# Patient Record
Sex: MALE | Employment: UNEMPLOYED | URBAN - METROPOLITAN AREA
[De-identification: names, ages, dates, MRNs, and addresses within clinical notes are randomized per-mention and may not be internally consistent; named-entity substitution may affect disease eponyms.]

---

## 2021-10-30 ENCOUNTER — HOSPITAL ENCOUNTER (OUTPATIENT)
Age: 51
Setting detail: OBSERVATION
Discharge: HOME | End: 2021-11-01
Attending: INTERNAL MEDICINE | Admitting: INTERNAL MEDICINE
Payer: MEDICAID

## 2021-10-30 DIAGNOSIS — E11.10 DIABETIC KETOACIDOSIS WITHOUT COMA ASSOCIATED WITH TYPE 2 DIABETES MELLITUS (HCC): Primary | ICD-10-CM

## 2021-10-30 PROBLEM — R73.9 HYPERGLYCEMIA: Status: ACTIVE | Noted: 2021-10-30

## 2021-10-30 LAB
ALBUMIN SERPL-MCNC: 4.4 GM/DL (ref 3.4–5)
ALP BLD-CCNC: 55 IU/L (ref 40–129)
ALT SERPL-CCNC: 26 U/L (ref 10–40)
ANION GAP SERPL CALCULATED.3IONS-SCNC: 12 MMOL/L (ref 4–16)
AST SERPL-CCNC: 23 IU/L (ref 15–37)
BACTERIA: NEGATIVE /HPF
BASE EXCESS: 1 (ref 0–3.3)
BASOPHILS ABSOLUTE: 0 K/CU MM
BASOPHILS RELATIVE PERCENT: 0.8 % (ref 0–1)
BETA-HYDROXYBUTYRATE: 1.5 MG/DL (ref 0–3)
BETA-HYDROXYBUTYRATE: 6.1 MG/DL (ref 0–3)
BILIRUB SERPL-MCNC: 0.5 MG/DL (ref 0–1)
BILIRUBIN URINE: NEGATIVE MG/DL
BLOOD, URINE: NEGATIVE
BUN BLDV-MCNC: 5 MG/DL (ref 6–23)
CALCIUM SERPL-MCNC: 9.4 MG/DL (ref 8.3–10.6)
CHLORIDE BLD-SCNC: 100 MMOL/L (ref 99–110)
CHP ED QC CHECK: NORMAL
CLARITY: CLEAR
CO2: 24 MMOL/L (ref 21–32)
COLOR: ABNORMAL
COMMENT: ABNORMAL
CREAT SERPL-MCNC: 0.6 MG/DL (ref 0.9–1.3)
DIFFERENTIAL TYPE: ABNORMAL
EOSINOPHILS ABSOLUTE: 0.2 K/CU MM
EOSINOPHILS RELATIVE PERCENT: 5.4 % (ref 0–3)
ESTIMATED AVERAGE GLUCOSE: 289 MG/DL
GFR AFRICAN AMERICAN: >60 ML/MIN/1.73M2
GFR NON-AFRICAN AMERICAN: >60 ML/MIN/1.73M2
GLUCOSE BLD-MCNC: 239 MG/DL
GLUCOSE BLD-MCNC: 239 MG/DL (ref 70–99)
GLUCOSE BLD-MCNC: 239 MG/DL (ref 70–99)
GLUCOSE BLD-MCNC: 248 MG/DL (ref 70–99)
GLUCOSE BLD-MCNC: 259 MG/DL (ref 70–99)
GLUCOSE BLD-MCNC: 286 MG/DL (ref 70–99)
GLUCOSE, URINE: >500 MG/DL
HBA1C MFR BLD: 11.7 % (ref 4.2–6.3)
HCO3 VENOUS: 26.6 MMOL/L (ref 19–25)
HCT VFR BLD CALC: 45.1 % (ref 42–52)
HEMOGLOBIN: 14.3 GM/DL (ref 13.5–18)
IMMATURE NEUTROPHIL %: 0 % (ref 0–0.43)
KETONES, URINE: ABNORMAL MG/DL
LEUKOCYTE ESTERASE, URINE: NEGATIVE
LYMPHOCYTES ABSOLUTE: 2 K/CU MM
LYMPHOCYTES RELATIVE PERCENT: 53.9 % (ref 24–44)
MCH RBC QN AUTO: 26.5 PG (ref 27–31)
MCHC RBC AUTO-ENTMCNC: 31.7 % (ref 32–36)
MCV RBC AUTO: 83.7 FL (ref 78–100)
MONOCYTES ABSOLUTE: 0.4 K/CU MM
MONOCYTES RELATIVE PERCENT: 9.8 % (ref 0–4)
MUCUS: ABNORMAL HPF
NITRITE URINE, QUANTITATIVE: NEGATIVE
NUCLEATED RBC %: 0 %
O2 SAT, VEN: 95.1 % (ref 50–70)
PCO2, VEN: 54 MMHG (ref 38–52)
PDW BLD-RTO: 12.4 % (ref 11.7–14.9)
PH VENOUS: 7.3 (ref 7.32–7.42)
PH, URINE: 6 (ref 5–8)
PLATELET # BLD: 192 K/CU MM (ref 140–440)
PMV BLD AUTO: 11.9 FL (ref 7.5–11.1)
PO2, VEN: 101 MMHG (ref 28–48)
POTASSIUM SERPL-SCNC: 4 MMOL/L (ref 3.5–5.1)
PROTEIN UA: NEGATIVE MG/DL
RBC # BLD: 5.39 M/CU MM (ref 4.6–6.2)
RBC URINE: <1 /HPF (ref 0–3)
SARS-COV-2, NAAT: NOT DETECTED
SEGMENTED NEUTROPHILS ABSOLUTE COUNT: 1.1 K/CU MM
SEGMENTED NEUTROPHILS RELATIVE PERCENT: 30.1 % (ref 36–66)
SODIUM BLD-SCNC: 136 MMOL/L (ref 135–145)
SOURCE: NORMAL
SPECIFIC GRAVITY UA: 1.01 (ref 1–1.03)
SQUAMOUS EPITHELIAL: <1 /HPF
TOTAL IMMATURE NEUTOROPHIL: 0 K/CU MM
TOTAL NUCLEATED RBC: 0 K/CU MM
TOTAL PROTEIN: 7.5 GM/DL (ref 6.4–8.2)
TRICHOMONAS: ABNORMAL /HPF
TROPONIN T: <0.01 NG/ML
UROBILINOGEN, URINE: NEGATIVE MG/DL (ref 0.2–1)
WBC # BLD: 3.7 K/CU MM (ref 4–10.5)
WBC UA: <1 /HPF (ref 0–2)

## 2021-10-30 PROCEDURE — 2580000003 HC RX 258: Performed by: FAMILY MEDICINE

## 2021-10-30 PROCEDURE — G0378 HOSPITAL OBSERVATION PER HR: HCPCS

## 2021-10-30 PROCEDURE — 81001 URINALYSIS AUTO W/SCOPE: CPT

## 2021-10-30 PROCEDURE — 96360 HYDRATION IV INFUSION INIT: CPT

## 2021-10-30 PROCEDURE — 93005 ELECTROCARDIOGRAM TRACING: CPT | Performed by: PHYSICIAN ASSISTANT

## 2021-10-30 PROCEDURE — 6370000000 HC RX 637 (ALT 250 FOR IP): Performed by: FAMILY MEDICINE

## 2021-10-30 PROCEDURE — 80053 COMPREHEN METABOLIC PANEL: CPT

## 2021-10-30 PROCEDURE — 82010 KETONE BODYS QUAN: CPT

## 2021-10-30 PROCEDURE — 82805 BLOOD GASES W/O2 SATURATION: CPT

## 2021-10-30 PROCEDURE — 99283 EMERGENCY DEPT VISIT LOW MDM: CPT

## 2021-10-30 PROCEDURE — 83036 HEMOGLOBIN GLYCOSYLATED A1C: CPT

## 2021-10-30 PROCEDURE — 85025 COMPLETE CBC W/AUTO DIFF WBC: CPT

## 2021-10-30 PROCEDURE — 96361 HYDRATE IV INFUSION ADD-ON: CPT

## 2021-10-30 PROCEDURE — 82800 BLOOD PH: CPT

## 2021-10-30 PROCEDURE — 2580000003 HC RX 258: Performed by: PHYSICIAN ASSISTANT

## 2021-10-30 PROCEDURE — 82962 GLUCOSE BLOOD TEST: CPT

## 2021-10-30 PROCEDURE — 87635 SARS-COV-2 COVID-19 AMP PRB: CPT

## 2021-10-30 PROCEDURE — 6370000000 HC RX 637 (ALT 250 FOR IP): Performed by: INTERNAL MEDICINE

## 2021-10-30 PROCEDURE — 84484 ASSAY OF TROPONIN QUANT: CPT

## 2021-10-30 PROCEDURE — 36415 COLL VENOUS BLD VENIPUNCTURE: CPT

## 2021-10-30 RX ORDER — SODIUM CHLORIDE 0.9 % (FLUSH) 0.9 %
5-40 SYRINGE (ML) INJECTION EVERY 12 HOURS SCHEDULED
Status: DISCONTINUED | OUTPATIENT
Start: 2021-10-30 | End: 2021-11-01 | Stop reason: HOSPADM

## 2021-10-30 RX ORDER — NICOTINE POLACRILEX 4 MG
15 LOZENGE BUCCAL PRN
Status: DISCONTINUED | OUTPATIENT
Start: 2021-10-30 | End: 2021-11-01 | Stop reason: HOSPADM

## 2021-10-30 RX ORDER — ACETAMINOPHEN 325 MG/1
650 TABLET ORAL EVERY 6 HOURS PRN
Status: DISCONTINUED | OUTPATIENT
Start: 2021-10-30 | End: 2021-11-01 | Stop reason: HOSPADM

## 2021-10-30 RX ORDER — 0.9 % SODIUM CHLORIDE 0.9 %
1000 INTRAVENOUS SOLUTION INTRAVENOUS ONCE
Status: COMPLETED | OUTPATIENT
Start: 2021-10-30 | End: 2021-10-30

## 2021-10-30 RX ORDER — DEXTROSE MONOHYDRATE 25 G/50ML
12.5 INJECTION, SOLUTION INTRAVENOUS PRN
Status: DISCONTINUED | OUTPATIENT
Start: 2021-10-30 | End: 2021-11-01 | Stop reason: HOSPADM

## 2021-10-30 RX ORDER — SODIUM CHLORIDE 0.9 % (FLUSH) 0.9 %
5-40 SYRINGE (ML) INJECTION PRN
Status: DISCONTINUED | OUTPATIENT
Start: 2021-10-30 | End: 2021-11-01 | Stop reason: HOSPADM

## 2021-10-30 RX ORDER — ONDANSETRON 4 MG/1
4 TABLET, ORALLY DISINTEGRATING ORAL EVERY 8 HOURS PRN
Status: DISCONTINUED | OUTPATIENT
Start: 2021-10-30 | End: 2021-11-01 | Stop reason: HOSPADM

## 2021-10-30 RX ORDER — DEXTROSE MONOHYDRATE 50 MG/ML
100 INJECTION, SOLUTION INTRAVENOUS PRN
Status: DISCONTINUED | OUTPATIENT
Start: 2021-10-30 | End: 2021-11-01 | Stop reason: HOSPADM

## 2021-10-30 RX ORDER — INSULIN GLARGINE 100 [IU]/ML
30 INJECTION, SOLUTION SUBCUTANEOUS NIGHTLY
Status: DISCONTINUED | OUTPATIENT
Start: 2021-10-30 | End: 2021-10-30

## 2021-10-30 RX ORDER — ONDANSETRON 2 MG/ML
4 INJECTION INTRAMUSCULAR; INTRAVENOUS EVERY 6 HOURS PRN
Status: DISCONTINUED | OUTPATIENT
Start: 2021-10-30 | End: 2021-11-01 | Stop reason: HOSPADM

## 2021-10-30 RX ORDER — ACETAMINOPHEN 650 MG/1
650 SUPPOSITORY RECTAL EVERY 6 HOURS PRN
Status: DISCONTINUED | OUTPATIENT
Start: 2021-10-30 | End: 2021-11-01 | Stop reason: HOSPADM

## 2021-10-30 RX ORDER — POLYETHYLENE GLYCOL 3350 17 G/17G
17 POWDER, FOR SOLUTION ORAL DAILY PRN
Status: DISCONTINUED | OUTPATIENT
Start: 2021-10-30 | End: 2021-11-01 | Stop reason: HOSPADM

## 2021-10-30 RX ORDER — SODIUM CHLORIDE 9 MG/ML
25 INJECTION, SOLUTION INTRAVENOUS PRN
Status: DISCONTINUED | OUTPATIENT
Start: 2021-10-30 | End: 2021-11-01 | Stop reason: HOSPADM

## 2021-10-30 RX ORDER — INSULIN GLARGINE 100 [IU]/ML
25 INJECTION, SOLUTION SUBCUTANEOUS NIGHTLY
Status: DISCONTINUED | OUTPATIENT
Start: 2021-10-30 | End: 2021-10-31

## 2021-10-30 RX ADMIN — INSULIN LISPRO 3 UNITS: 100 INJECTION, SOLUTION INTRAVENOUS; SUBCUTANEOUS at 18:11

## 2021-10-30 RX ADMIN — INSULIN GLARGINE 25 UNITS: 100 INJECTION, SOLUTION SUBCUTANEOUS at 21:02

## 2021-10-30 RX ADMIN — INSULIN LISPRO 4 UNITS: 100 INJECTION, SOLUTION INTRAVENOUS; SUBCUTANEOUS at 21:02

## 2021-10-30 RX ADMIN — SODIUM CHLORIDE, PRESERVATIVE FREE 10 ML: 5 INJECTION INTRAVENOUS at 21:01

## 2021-10-30 RX ADMIN — SODIUM CHLORIDE 1000 ML: 9 INJECTION, SOLUTION INTRAVENOUS at 16:17

## 2021-10-30 RX ADMIN — SODIUM CHLORIDE 1000 ML: 9 INJECTION, SOLUTION INTRAVENOUS at 17:44

## 2021-10-30 NOTE — ED PROVIDER NOTES
EKG:  Normal sinus rhythm with a rate of 90. CA interval 150, QRS 80, QTc 440. No ST elevations or depressions. Normal T waves. Impression: Normal EKG. No previous EKGs for comparison.       Joel Edgar MD  10/30/21 0637

## 2021-10-30 NOTE — ED PROVIDER NOTES
Patient Identification  Rufino Rousseau is a 46 y.o. male    Chief Complaint  Blood Sugar Problem        HPI  (History provided by patient through Kinyarwanda interpretor, [facility] interpretor unavailable)  This is a 46 y.o. male who was brought in by self for chief complaint of hyperglycemia. Patient reports h/o diabetes. States now he feels \"very sick\". Has been diabetic for 12 years. 2 days ago he began using oils to manage his health, now feels bad. Reports feeling generally weak, fatigue, HA. Ran out of his normal diabetes medicines 8-10 days ago. Typically on metformin 500 mg, \"gabafen? \". States he has been walking a large amount. REVIEW OF SYSTEMS    Constitutional:  Denies fever, chills  HENT:  Denies sore throat or ear pain   Eyes: Denies eye pain  Cardiovascular:  Denies chest pain  Respiratory:  Denies shortness of breath, cough   GI:  Denies abdominal pain, vomiting  :  Denies dysuria  Musculoskeletal:  Denies back pain. + joint pain from walking. Skin:  Denies rash  Neurologic:   + VILLELA    See HPI and nursing notes for additional information     I have reviewed the following nursing documentation:  Allergies: No Known Allergies    Past medical history:  has a past medical history of Diabetes mellitus ([facility] [address] Past surgical history:  has no past surgical history on file. Home medications:   Prior to Admission medications    Not on File       Social history:  reports that he has never smoked. He has never used smokeless tobacco. He reports previous alcohol use. He reports that he does not use drugs. Family history:  History reviewed. No pertinent family history. Exam  BP (!) 153/90   Pulse 91   Temp 97.8 °F (36.6 °C) (Oral)   Resp 12   Ht 5' 7\" (1.702 m)   Wt 168 lb (76.2 kg)   SpO2 100%   BMI 26.31 kg/m²   Nursing note and vitals reviewed. Constitutional: Well developed, well nourished. No acute distress. HENT:      Head: Normocephalic and atraumatic. Ears: External ears normal.      Nose: Nose normal.     Mouth: Membrane mucosa moist and pink. No posterior oropharynx erythema or tonsillar edema  Eyes: Anicteric sclera. No discharge, PERRL  Neck: Supple. Trachea midline. Cardiovascular: RRR, no murmurs, rubs, or gallops, radial pulses 2+ bilaterally. Pulmonary/Chest: Effort normal. No respiratory distress. CTAB. No stridor. No wheezes. No rales. Abdominal: Soft. Nontender to palpation. No distension. No guarding, rebound tenderness, or evidence of ascites. : No CVA tenderness. Musculoskeletal: Moves all extremities. No gross deformity. Neurological: Alert and oriented to person, place, and time. Normal muscle tone. Skin: Warm and dry. No rash. Psychiatric: Normal mood and affect. Behavior is normal.      EKG   Please see Dr. Parminder Larsen' note for EKG read.       Radiographs (if obtained):  [] The following radiograph was interpreted by myself in the absence of a radiologist:   [x] Radiologist's Report Reviewed:  No orders to display          Labs  Results for orders placed or performed during the hospital encounter of 10/30/21   COVID-19, Rapid    Specimen: Nasopharyngeal   Result Value Ref Range    Source THROAT     SARS-CoV-2, NAAT NOT DETECTED NOT DETECTED   CBC Auto Differential   Result Value Ref Range    WBC 3.7 (L) 4.0 - 10.5 K/CU MM    RBC 5.39 4.6 - 6.2 M/CU MM    Hemoglobin 14.3 13.5 - 18.0 GM/DL    Hematocrit 45.1 42 - 52 %    MCV 83.7 78 - 100 FL    MCH 26.5 (L) 27 - 31 PG    MCHC 31.7 (L) 32.0 - 36.0 %    RDW 12.4 11.7 - 14.9 %    Platelets 621 521 - 284 K/CU MM    MPV 11.9 (H) 7.5 - 11.1 FL    Differential Type AUTOMATED DIFFERENTIAL     Segs Relative 30.1 (L) 36 - 66 %    Lymphocytes % 53.9 (H) 24 - 44 %    Monocytes % 9.8 (H) 0 - 4 %    Eosinophils % 5.4 (H) 0 - 3 %    Basophils % 0.8 0 - 1 %    Segs Absolute 1.1 K/CU MM    Lymphocytes Absolute 2.0 K/CU MM    Monocytes Absolute 0.4 K/CU MM    Eosinophils Absolute 0.2 K/CU MM Basophils Absolute 0.0 K/CU MM    Nucleated RBC % 0.0 %    Total Nucleated RBC 0.0 K/CU MM    Total Immature Neutrophil 0.00 K/CU MM    Immature Neutrophil % 0.0 0 - 0.43 %   CMP   Result Value Ref Range    Sodium 136 135 - 145 MMOL/L    Potassium 4.0 3.5 - 5.1 MMOL/L    Chloride 100 99 - 110 mMol/L    CO2 24 21 - 32 MMOL/L    BUN 5 (L) 6 - 23 MG/DL    CREATININE 0.6 (L) 0.9 - 1.3 MG/DL    Glucose 286 (H) 70 - 99 MG/DL    Calcium 9.4 8.3 - 10.6 MG/DL    Albumin 4.4 3.4 - 5.0 GM/DL    Total Protein 7.5 6.4 - 8.2 GM/DL    Total Bilirubin 0.5 0.0 - 1.0 MG/DL    ALT 26 10 - 40 U/L    AST 23 15 - 37 IU/L    Alkaline Phosphatase 55 40 - 129 IU/L    GFR Non-African American >60 >60 mL/min/1.73m2    GFR African American >60 >60 mL/min/1.73m2    Anion Gap 12 4 - 16   Beta-Hydroxybutyrate   Result Value Ref Range    Beta-Hydroxybutyrate 6.1 (H) 0.0 - 3.0 MG/DL   Urinalysis   Result Value Ref Range    Color, UA STRAW (A) YELLOW    Clarity, UA CLEAR CLEAR    Glucose, Urine >500 (A) NEGATIVE MG/DL    Bilirubin Urine NEGATIVE NEGATIVE MG/DL    Ketones, Urine SMALL (A) NEGATIVE MG/DL    Specific Gravity, UA 1.011 1.001 - 1.035    Blood, Urine NEGATIVE NEGATIVE    pH, Urine 6.0 5.0 - 8.0    Protein, UA NEGATIVE NEGATIVE MG/DL    Urobilinogen, Urine NEGATIVE 0.2 - 1.0 MG/DL    Nitrite Urine, Quantitative NEGATIVE NEGATIVE    Leukocyte Esterase, Urine NEGATIVE NEGATIVE    RBC, UA <1 0 - 3 /HPF    WBC, UA <1 0 - 2 /HPF    Bacteria, UA NEGATIVE NEGATIVE /HPF    Squam Epithel, UA <1 /HPF    Mucus, UA RARE (A) NEGATIVE HPF    Trichomonas, UA NONE SEEN NONE SEEN /HPF   Troponin   Result Value Ref Range    Troponin T <0.010 <0.01 NG/ML   Blood Gas, Venous   Result Value Ref Range    pH, Tomas 7.30 (L) 7.32 - 7.42    pCO2, Tomas 54 (H) 38 - 52 mmHG    pO2, Tomas 101 (H) 28 - 48 mmHG    Base Excess 1 0 - 3.3    HCO3, Venous 26.6 (H) 19 - 25 MMOL/L    O2 Sat, Tomas 95.1 (H) 50 - 70 %    Comment VBG    POCT Glucose   Result Value Ref Range    POC Glucose 248 (H) 70 - 99 MG/DL   POCT Glucose   Result Value Ref Range    Glucose 239 mg/dL    QC OK? ok    POCT Glucose   Result Value Ref Range    POC Glucose 239 (H) 70 - 99 MG/DL   EKG 12 Lead   Result Value Ref Range    Ventricular Rate 90 BPM    Atrial Rate 90 BPM    P-R Interval 150 ms    QRS Duration 80 ms    Q-T Interval 360 ms    QTc Calculation (Bazett) 440 ms    P Axis 77 degrees    R Axis 43 degrees    T Axis 54 degrees    Diagnosis       Normal sinus rhythm  Normal ECG  No previous ECGs available           MDM  Patient presents for hyperglycemia, out of his meds, I am unable to identify all of his meds. He has normal VS.  States feels generally unwell. Labs concerning for mild DKA. Insulin infusion ordered. Plan is to admit. Spoke with hospitalist, Laron Lane CNP, who agreed to admit patient, recommended changing patient to insulin SQ and fluids. In consideration of current COVID19 pandemic, with effort to minimize unnecessary provider exposure, this patient was seen at bedside by me independently. However, in compliance with current hospital RALPH/ED protocol, prior to admission I did discuss this patient case with emergency department physician, Dr. Carlyn Ross. Of note, this Pt was NOT admitted to the ICU. Final Impression  1. Diabetic ketoacidosis without coma associated with type 2 diabetes mellitus (HCC)        Blood pressure (!) 153/90, pulse 91, temperature 97.8 °F (36.6 °C), temperature source Oral, resp. rate 12, height 5' 7\" (1.702 m), weight 168 lb (76.2 kg), SpO2 100 %. Disposition:  Admit to telemetry in stable condition. Patient was given scripts for the following medications. I counseled patient how to take these medications. New Prescriptions    No medications on file       This chart was generated using the [address] [place], [place] [address]  dictation system. I created this record but it may contain dictation errors given the limitations of this technology.        Colby Hernandez, OMAIRA  10/30/21 1874

## 2021-10-30 NOTE — ED NOTES
Vitals completed, patient is [place] [facility] speaking, hyperglycemia blood glucose 248     Sasha Pollack  10/30/21 1034

## 2021-10-30 NOTE — PLAN OF CARE
RALPH note to follow     I saw this patient who came in with DM. Not sure if this is even DKA. We repeated all the labs. The patient has a history of DM and HTN( not being treated)    Repeat labs   Start insulin   He is on metformin- hold   From there we will assess if he needs insulin drip.  The patient has no gap, HCO3 is >18     General: NAD, AAOX3  Lungs: CTAB  CVS: Normal s1s2  Abd: NT, ND   Ext: no edema

## 2021-10-30 NOTE — PROGRESS NOTES
Pt states \"I am here for the temporary license to be in [place] visiting my friend. I was dropped off by my friend when they said that my blood sugar was high. My people are in [place]. I am just passing through for now living with my friend neighbor. \"    Pt denies thoughts of harming himself or anyone else. Pt gave neighbor (Merceda Gowers) number [phone] to give her an update.

## 2021-10-30 NOTE — CONSULTS
Endocrinology   Consult Note      Dear Doctor Shirlyn Rinne for the Consult     Pt. Was Admitted for : Generalized weakness and hyperglycemia    Reason for Consult: Better control of blood glucose      History Obtained From:  Patient/ EMR       HISTORY OF PRESENT ILLNESS:                The patient is a 46 y.o. male with significant past medical history of diabetes and hypertension has been taking Metformin for his diabetes but ran out of it few days of back not take any medicine for hypertension I was  consulted for better control of blood glucose. ROS:   Pt's ROS done in detail. Abnormal ROS are noted in Medical and Surgical History Section below: Other Medical History:        Diagnosis Date    Diabetes mellitus ([address]      Surgical History:    History reviewed. No pertinent surgical history. Allergies:  Patient has no known allergies. Family History:   History reviewed. No pertinent family history. REVIEW OF SYSTEMS:  Review of System Done as noted above     PHYSICAL EXAM:      Vitals:    BP (!) 154/88   Pulse 82   Temp 97.8 °F (36.6 °C) (Oral)   Resp 16   Ht 5' 7\" (1.702 m)   Wt 168 lb (76.2 kg)   SpO2 100%   BMI 26.31 kg/m²     CONSTITUTIONAL:  awake, alert, cooperative, appears stated age   EYES:  vision intact Fundoscopic Exam not performed   ENT:Normal  NECK:  Supple, No JVD. Thyroid Exam:Normal   LUNGS:  Has Vesicular Breath Sounds,   CARDIOVASCULAR:  Normal apical impulse, regular rate and rhythm, normal S1 and S2, no S3 or S4, and has no  murmur   ABDOMEN:  No scars, normal bowel sounds, soft, non-distended, non-tender, no masses palpated, no hepatolienomegaly  Musculoskeletal: Normal  Extremities: Normal, peripheral pulses normal, , has no edema   NEUROLOGIC:  Awake, alert, oriented to name, place and time. Cranial nerves II-XII are grossly intact. Motor is  intact. Sensory is intact.  ,  and gait is normal.    DATA:    CBC:   Recent Labs     10/30/21  1000   WBC 3.7*   HGB 14.3       CMP:  Recent Labs     10/30/21  1000      K 4.0      CO2 24   BUN 5*   CREATININE 0.6*   CALCIUM 9.4   PROT 7.5   LABALBU 4.4   BILITOT 0.5   ALKPHOS 55   AST 23   ALT 26     Lipids: No results found for: CHOL, HDL, TRIG  Glucose:   Recent Labs     10/30/21  0901 10/30/21  1555 10/30/21  1746   POCGLU 248* 239* 259*     Hemoglobin A1C: No results found for: LABA1C  Free T4: No results found for: T4FREE  Free T3: No results found for: FT3  TSH High Sensitivity: No results found for: TSHHS    No orders to display          Scheduled Medicines   Medications:    sodium chloride  1,000 mL IntraVENous Once    insulin lispro  0-6 Units SubCUTAneous TID WC    insulin lispro  0-3 Units SubCUTAneous Nightly    sodium chloride flush  5-40 mL IntraVENous 2 times per day    enoxaparin  40 mg SubCUTAneous Daily    insulin glargine  30 Units SubCUTAneous Nightly    insulin lispro  10 Units SubCUTAneous TID WC    insulin lispro  0-12 Units SubCUTAneous TID WC    insulin lispro  0-12 Units SubCUTAneous 2 times per day      Infusions:    dextrose      sodium chloride           IMPRESSION    Patient Active Problem List   Diagnosis    Hyperglycemia         RECOMMENDATIONS:      1. Reviewed POC blood glucose . Labs and X ray results   2. Reviewed Home and Current Medicines   3. Will Start On meal/ Correction bolus Humalog/ Lantus Insulin regime    4. Monitor Blood glucose frequently   5. Modify  the dose of Insulin as needed        Will follow with you  Again thank you for sharing pt's care with me.      Truly yours,       Moon Brannon MD

## 2021-10-31 LAB
ALBUMIN SERPL-MCNC: 4 GM/DL (ref 3.4–5)
ALP BLD-CCNC: 48 IU/L (ref 40–129)
ALT SERPL-CCNC: 22 U/L (ref 10–40)
ANION GAP SERPL CALCULATED.3IONS-SCNC: 10 MMOL/L (ref 4–16)
AST SERPL-CCNC: 22 IU/L (ref 15–37)
BASOPHILS ABSOLUTE: 0 K/CU MM
BASOPHILS RELATIVE PERCENT: 0.6 % (ref 0–1)
BILIRUB SERPL-MCNC: 0.5 MG/DL (ref 0–1)
BUN BLDV-MCNC: 7 MG/DL (ref 6–23)
CALCIUM SERPL-MCNC: 9 MG/DL (ref 8.3–10.6)
CHLORIDE BLD-SCNC: 104 MMOL/L (ref 99–110)
CO2: 22 MMOL/L (ref 21–32)
CREAT SERPL-MCNC: 0.6 MG/DL (ref 0.9–1.3)
DIFFERENTIAL TYPE: ABNORMAL
EOSINOPHILS ABSOLUTE: 0.2 K/CU MM
EOSINOPHILS RELATIVE PERCENT: 3.8 % (ref 0–3)
GFR AFRICAN AMERICAN: >60 ML/MIN/1.73M2
GFR NON-AFRICAN AMERICAN: >60 ML/MIN/1.73M2
GLUCOSE BLD-MCNC: 117 MG/DL (ref 70–99)
GLUCOSE BLD-MCNC: 168 MG/DL (ref 70–99)
GLUCOSE BLD-MCNC: 279 MG/DL (ref 70–99)
GLUCOSE BLD-MCNC: 84 MG/DL (ref 70–99)
GLUCOSE BLD-MCNC: 94 MG/DL (ref 70–99)
HCT VFR BLD CALC: 43.9 % (ref 42–52)
HEMOGLOBIN: 13.9 GM/DL (ref 13.5–18)
IMMATURE NEUTROPHIL %: 0 % (ref 0–0.43)
LYMPHOCYTES ABSOLUTE: 2.3 K/CU MM
LYMPHOCYTES RELATIVE PERCENT: 47.4 % (ref 24–44)
MCH RBC QN AUTO: 26.9 PG (ref 27–31)
MCHC RBC AUTO-ENTMCNC: 31.7 % (ref 32–36)
MCV RBC AUTO: 85.1 FL (ref 78–100)
MONOCYTES ABSOLUTE: 0.4 K/CU MM
MONOCYTES RELATIVE PERCENT: 9.2 % (ref 0–4)
NUCLEATED RBC %: 0 %
PDW BLD-RTO: 12.7 % (ref 11.7–14.9)
PLATELET # BLD: 173 K/CU MM (ref 140–440)
PMV BLD AUTO: 11.8 FL (ref 7.5–11.1)
POTASSIUM SERPL-SCNC: 3.9 MMOL/L (ref 3.5–5.1)
RBC # BLD: 5.16 M/CU MM (ref 4.6–6.2)
SEGMENTED NEUTROPHILS ABSOLUTE COUNT: 1.9 K/CU MM
SEGMENTED NEUTROPHILS RELATIVE PERCENT: 39 % (ref 36–66)
SODIUM BLD-SCNC: 136 MMOL/L (ref 135–145)
TOTAL IMMATURE NEUTOROPHIL: 0 K/CU MM
TOTAL NUCLEATED RBC: 0 K/CU MM
TOTAL PROTEIN: 6.6 GM/DL (ref 6.4–8.2)
WBC # BLD: 4.8 K/CU MM (ref 4–10.5)

## 2021-10-31 PROCEDURE — 2580000003 HC RX 258: Performed by: FAMILY MEDICINE

## 2021-10-31 PROCEDURE — 80053 COMPREHEN METABOLIC PANEL: CPT

## 2021-10-31 PROCEDURE — 6360000002 HC RX W HCPCS: Performed by: FAMILY MEDICINE

## 2021-10-31 PROCEDURE — 36415 COLL VENOUS BLD VENIPUNCTURE: CPT

## 2021-10-31 PROCEDURE — 6370000000 HC RX 637 (ALT 250 FOR IP): Performed by: INTERNAL MEDICINE

## 2021-10-31 PROCEDURE — 82962 GLUCOSE BLOOD TEST: CPT

## 2021-10-31 PROCEDURE — 94761 N-INVAS EAR/PLS OXIMETRY MLT: CPT

## 2021-10-31 PROCEDURE — G0378 HOSPITAL OBSERVATION PER HR: HCPCS

## 2021-10-31 PROCEDURE — 85025 COMPLETE CBC W/AUTO DIFF WBC: CPT

## 2021-10-31 PROCEDURE — 96372 THER/PROPH/DIAG INJ SC/IM: CPT

## 2021-10-31 RX ORDER — SODIUM CHLORIDE 9 MG/ML
INJECTION, SOLUTION INTRAVENOUS CONTINUOUS
Status: DISPENSED | OUTPATIENT
Start: 2021-10-31 | End: 2021-11-01

## 2021-10-31 RX ORDER — INSULIN GLARGINE 100 [IU]/ML
35 INJECTION, SOLUTION SUBCUTANEOUS NIGHTLY
Status: DISCONTINUED | OUTPATIENT
Start: 2021-10-31 | End: 2021-10-31

## 2021-10-31 RX ADMIN — SODIUM CHLORIDE, PRESERVATIVE FREE 10 ML: 5 INJECTION INTRAVENOUS at 09:22

## 2021-10-31 RX ADMIN — ENOXAPARIN SODIUM 40 MG: 40 INJECTION SUBCUTANEOUS at 09:21

## 2021-10-31 RX ADMIN — INSULIN LISPRO 6 UNITS: 100 INJECTION, SOLUTION INTRAVENOUS; SUBCUTANEOUS at 20:31

## 2021-10-31 RX ADMIN — METFORMIN HYDROCHLORIDE 500 MG: 500 TABLET ORAL at 16:11

## 2021-10-31 RX ADMIN — SODIUM CHLORIDE: 9 INJECTION, SOLUTION INTRAVENOUS at 13:42

## 2021-10-31 RX ADMIN — INSULIN HUMAN 20 UNITS: 100 INJECTION, SUSPENSION SUBCUTANEOUS at 20:31

## 2021-10-31 RX ADMIN — SODIUM CHLORIDE, PRESERVATIVE FREE 10 ML: 5 INJECTION INTRAVENOUS at 20:31

## 2021-10-31 NOTE — PLAN OF CARE
Problem: Falls - Risk of:  Goal: Will remain free from falls  Description: Will remain free from falls  Outcome: Met This Shift  Goal: Absence of physical injury  Description: Absence of physical injury  Outcome: Met This Shift     Problem: Discharge Planning:  Goal: Discharged to appropriate level of care  Description: Discharged to appropriate level of care  Outcome: Ongoing     Problem: Serum Glucose Level - Abnormal:  Goal: Ability to maintain appropriate glucose levels has stabilized  Description: Ability to maintain appropriate glucose levels has stabilized  Outcome: Ongoing

## 2021-10-31 NOTE — CARE COORDINATION
Consult received d/t pt does not have a PCP or insurance and has been out of his medication. Pt is [place] and does not speak english per chart review. PCP list added to d/c instructions. Referral made to [facility] via confidential VM. Referral made to [facility]ist.  Med Assist will follow. NOTIFY [facility] MED ASSIST [phone] IF PT IS D/C'D TODAY. Notify CM if any other d/c needs arise.   TE

## 2021-10-31 NOTE — PROGRESS NOTES
Progress Note( Dr. Allison Chinchilla)  10/31/2021  Subjective:   Admit Date: 10/30/2021  PCP: No primary care provider on file. Admitted For : Generalized weakness and hyperglycemia  Has ran out of  Metformin    Consulted For: Better control of blood glucose    Interval History: Feels somewhat better glucose of better control    Denies any chest pains,   Denies SOB . Denies nausea or vomiting. No new bowel or bladder symptoms.        Intake/Output Summary (Last 24 hours) at 10/31/2021 1513  Last data filed at 10/31/2021 1021  Gross per 24 hour   Intake 300 ml   Output --   Net 300 ml       DATA    CBC:   Recent Labs     10/30/21  1000 10/31/21  0618   WBC 3.7* 4.8   HGB 14.3 13.9    173    CMP:  Recent Labs     10/30/21  1000 10/31/21  0618    136   K 4.0 3.9    104   CO2 24 22   BUN 5* 7   CREATININE 0.6* 0.6*   CALCIUM 9.4 9.0   PROT 7.5 6.6   LABALBU 4.4 4.0   BILITOT 0.5 0.5   ALKPHOS 55 48   AST 23 22   ALT 26 22     Lipids: No results found for: CHOL, HDL, TRIG  Glucose:  Recent Labs     10/30/21  2104 10/31/21  0838 10/31/21  1123   POCGLU 239* 94 117*     [id number]:  Lab Results   Component Value Date    LABA1C 11.7 10/30/2021     High Sensitivity TSH: No results found for: TSHHS  Free T3: No results found for: FT3  Free T4:No results found for: T4FREE    No orders to display        Scheduled Medicines   Medications:    metFORMIN  500 mg Oral BID WC    insulin NPH  20 Units SubCUTAneous Nightly    sodium chloride flush  5-40 mL IntraVENous 2 times per day    enoxaparin  40 mg SubCUTAneous Daily    insulin lispro  0-12 Units SubCUTAneous TID WC    insulin lispro  0-12 Units SubCUTAneous 2 times per day      Infusions:    sodium chloride 75 mL/hr at 10/31/21 1342    dextrose      sodium chloride           Objective:   Vitals: /77   Pulse 84   Temp 98.6 °F (37 °C) (Oral)   Resp 16   Ht 5' 7\" (1.702 m)   Wt 168 lb (76.2 kg)   SpO2 100%   BMI 26.31 kg/m²   General appearance: alert and cooperative with exam  Neck: no JVD or bruit  Thyroid : Normal lobes   Lungs: Has Vesicular Breath sounds   Heart:  regular rate and rhythm  Abdomen: soft, non-tender; bowel sounds normal; no masses,  no organomegaly  Musculoskeletal: Normal  Extremities: extremities normal, , no edema  Neurologic:  Awake, alert, oriented to name, place and time. Cranial nerves II-XII are grossly intact. Motor is  intact. Sensory is intact. ,  and gait is normal.    Assessment:     Patient Active Problem List:     Hyperglycemia      Plan:     1. Reviewed POC blood glucose . Labs and X ray results   2. Reviewed Current Medicines   3. On  Correction bolus Humalog/ Basal NPH  Insulin regime / and Oral Hypoglycemic drugs   4. Monitor Blood glucose frequently   5. Modified  the dose of Insulin/ other medicines as needed   6. Patient does not have insurance or primary care Case management is making arrangement for to arrange for his medicines  7. Will follow     .      Andrea Banuelos MD, MD

## 2021-10-31 NOTE — PROGRESS NOTES
Hospitalist Progress Note      Name:  Denver Greene /Age/Sex: 1970  (46 y.o. male)   MRN & CSN:  [id number] & [id number] Admission Date/Time: 10/30/2021  9:25 AM   [facility] PCP: No primary care provider on file. Hospital Day: 2    Assessment and Plan:   Denver Greene is a 46 y.o.  male  who presents with hyperglycemia. Type 2 diabetes with hyperglycemia   - initial glucose on admission 286; initially with concern for DKA given pH 7.3 and elevated BHB, but bicarb normal  -Repeat BHB and BMP WNL  - A1c 11.7  -Glucose improved with initiation of basal bolus regimen and resuming Metformin  -Endocrinology following, will likely need to be discharged home on insulin    Orthostasis   - BP remained stable, but HR aimee from 60-110s upon standing  - complains of lightheadedness   - likely secondary to dehydration in setting of hyperglycemia  - started gentle IVF   - will recheck orthostats in AM    Hypertension  -Was previously not taking anything for blood pressure  -Started on lisinopril with improvement  -Continue to monitor BP    This patient was seen and examined autonomously  A hospitalist attending physician was available for questions/consultation as needed. Diet ADULT DIET; Regular; 5 carb choices (75 gm/meal)   DVT Prophylaxis [] Lovenox, []  Heparin, [] SCDs, [] Ambulation   GI Prophylaxis [] PPI,  [] H2 Blocker,  [] Carafate,  [x] Diet/Tube Feeds   Code Status Full Code   Disposition Patient requires continued admission due to orthostasis          History of Present Illness:      Chief Complaint: Denver Greene is a 46 y.o.  male  who presents with hyperglycemia. Patient seen and examined at bedside with assistance of . Patient states he is feeling better today, but is complaining of some lightheadedness due to feeling like he is not eating as much food as he normally does. He denies any chest pain, shortness of breath.   States he had some upper arm pain when he came in however this has resolved. He denies any syncope. Denies any recent illness, nausea vomiting or diarrhea. We discussed plan of care for today including continue to monitor his blood glucose and giving him some fluids to the IV, patient is agreeable. Ten point ROS reviewed negative, unless as noted above    Objective:   No intake or output data in the 24 hours ending 10/31/21 0755   Vitals:   Vitals:    10/31/21 0615   BP: 117/75   Pulse: 82   Resp: 16   Temp: 98.3 °F (36.8 °C)   SpO2: 100%     Physical Exam:     GEN Awake male, sitting upright in bed in no apparent distress. Appears given age. EYES Pupils are equally round. No scleral erythema, discharge, or conjunctivitis. HENT Mucous membranes are moist.  NECK Supple, no apparent thyromegaly or masses. RESP Clear to auscultation, no wheezes, rales or rhonchi. Respirations even and unlabored on RA. CARDIO/VASC   S1/S2 auscultated. Regular rate without appreciable murmurs, rubs, or gallops. Peripheral pulses equal bilaterally and palpable. No peripheral edema. GI Abdomen is soft without significant tenderness, masses, or guarding. Bowel sounds are normoactive.  No costovertebral angle tenderness. Hoskins catheter is not present. MSK No gross joint deformities. SKIN Normal coloration, warm, dry. NEURO Cranial nerves appear grossly intact, normal speech, no lateralizing weakness. PSYCH Awake, alert, oriented x 4. Affect appropriate.     Medications:   Medications:    insulin glargine  35 Units SubCUTAneous Nightly    sodium chloride flush  5-40 mL IntraVENous 2 times per day    enoxaparin  40 mg SubCUTAneous Daily    insulin lispro  0-12 Units SubCUTAneous TID     insulin lispro  0-12 Units SubCUTAneous 2 times per day    insulin lispro  10 Units SubCUTAneous TID       Infusions:    dextrose      sodium chloride       PRN Meds: glucose, 15 g, PRN  dextrose, 12.5 g, PRN  glucagon (rDNA), 1 mg,

## 2021-10-31 NOTE — H&P
History and Physical      Name:  Vickie Berry /Age/Sex: 1970  (46 y.o. male)   MRN & CSN:  [id number] & [id number] Admission Date/Time: 10/30/2021  9:25 AM   [facility]  [facility] PCP: No primary care provider on file. Hospital Day: 2    Assessment and Plan:   Vickie Berry is a 46 y.o.  male  who presents with hyperglycemia and upper arm pain    1. Diabetes type 2  1. A1C (10/30) 11.7  2. Moved from [facility] in 2021, ran out of his metformin  3. IV fluids given, continue at 100 ml/hr  4. SSI  5. Accucheck Ac/Hs  6. Consult case management for assistance with PCP and meds    2. Essential Hypertension        1. Strong family history of htn        2. Has not taken medications previously for this        3. Initiate lisinopril       Diet ADULT DIET; Regular; 5 carb choices (75 gm/meal)   DVT Prophylaxis [] Lovenox, []  Heparin, [] SCDs, [] Ambulation   GI Prophylaxis [] PPI,  [] H2 Blocker,  [] Carafate,  [] Diet/Tube Feeds   Code Status Full Code   Disposition Patient requires continued admission due to hyperglycemia and medication assistance          History of Present Illness:     Chief Complaint: Hyperglycemia  Vickie Berry is a 46 y.o.  male  who presents with elevated blood sugars and upper arm pain. Pt is [place] speaking. Unable to utilize a [place]  however pt does speak [place]. [facility]  used for communication. Pt is a diabetic who takes metformin daily however he ran out due to moving to the [address] [place] [address],[address] from [facility] and not having a PCP to continue his medications. Pt came to the Er due to having upper arm pain which is his indicator that his blood sugar is elevated. Pt denies any nausea or vomiting. Pt denies any chest pain or shortness of breath.  Pt denies any other symptoms        Ten point ROS reviewed negative, unless as noted above    Objective:   No intake or output data in the 24 hours ending 10/31/21 0825   Vitals:   Vitals:    10/31/21 0615   BP: 117/75   Pulse: 82   Resp: 16   Temp: 98.3 °F (36.8 °C)   SpO2: 100%     Physical Exam:   GEN Awake male, sitting upright in bed in no apparent distress. Appears given age. EYES Pupils are equally round. No scleral erythema, discharge, or conjunctivitis. HENT Mucous membranes are moist. Oral pharynx without exudates, no evidence of thrush. NECK Supple, no apparent thyromegaly or masses. RESP Clear to auscultation, no wheezes, rales or rhonchi. Symmetric chest movement while on room air. CARDIO/VASC S1/S2 auscultated. Regular rate without appreciable murmurs, rubs, or gallops. No JVD or carotid bruits. Peripheral pulses equal bilaterally and palpable. No peripheral edema. GI Abdomen is soft without significant tenderness, masses, or guarding. Bowel sounds are normoactive. Rectal exam deferred.  No costovertebral angle tenderness. Hoskins catheter is not present. HEME/LYMPH No palpable cervical lymphadenopathy and no hepatosplenomegaly. No petechiae or ecchymoses. MSK No gross joint deformities. SKIN Normal coloration, warm, dry. NEURO Cranial nerves appear grossly intact, normal speech, no lateralizing weakness. PSYCH Awake, alert, oriented x 4. Affect appropriate. Past Medical History:      Past Medical History:   Diagnosis Date    Diabetes mellitus ([facility] [address]      PSHX:  has no past surgical history on file. Allergies: No Known Allergies    FAM HX: family history is not on file.   Soc HX:   Social History     Socioeconomic History    Marital status:      Spouse name: None    Number of children: None    Years of education: None    Highest education level: None   Occupational History    None   Tobacco Use    Smoking status: Never Smoker    Smokeless tobacco: Never Used   Substance and Sexual Activity    Alcohol use: Not Currently    Drug use: Never    Sexual activity: None   Other Topics Concern    None   Social History Narrative    None     Social Determinants of Health Financial Resource Strain:     Difficulty of Paying Living Expenses:    Food Insecurity:     Worried About Running Out of Food in the Last Year:     [address] in the Last Year:    Transportation Needs:     Lack of Transportation (Medical):      Lack of Transportation (Non-Medical):    Physical Activity:     Days of Exercise per Week:     Minutes of Exercise per Session:    Stress:     Feeling of Stress :    Social Connections:     Frequency of Communication with Friends and Family:     Frequency of Social Gatherings with Friends and Family:     Attends [employer] Services:     Active Member of Clubs or Organizations:     Attends Club or Organization Meetings:     Marital Status:    Intimate Partner Violence:     Fear of Current or Ex-Partner:     Emotionally Abused:     Physically Abused:     Sexually Abused:        Medications:   Medications:    insulin glargine  35 Units SubCUTAneous Nightly    sodium chloride flush  5-40 mL IntraVENous 2 times per day    enoxaparin  40 mg SubCUTAneous Daily    insulin lispro  0-12 Units SubCUTAneous TID WC    insulin lispro  0-12 Units SubCUTAneous 2 times per day    insulin lispro  10 Units SubCUTAneous TID WC      Infusions:    dextrose      sodium chloride       PRN Meds: glucose, 15 g, PRN  dextrose, 12.5 g, PRN  glucagon (rDNA), 1 mg, PRN  dextrose, 100 mL/hr, PRN  sodium chloride flush, 5-40 mL, PRN  sodium chloride, 25 mL, PRN  ondansetron, 4 mg, Q8H PRN   Or  ondansetron, 4 mg, Q6H PRN  polyethylene glycol, 17 g, Daily PRN  acetaminophen, 650 mg, Q6H PRN   Or  acetaminophen, 650 mg, Q6H PRN          Electronically signed by RAFAL Mccracken CNP on 10/31/2021 at 8:25 AM

## 2021-11-01 VITALS
HEIGHT: 67 IN | SYSTOLIC BLOOD PRESSURE: 115 MMHG | TEMPERATURE: 97.7 F | HEART RATE: 86 BPM | BODY MASS INDEX: 26.37 KG/M2 | RESPIRATION RATE: 16 BRPM | WEIGHT: 168 LBS | DIASTOLIC BLOOD PRESSURE: 67 MMHG | OXYGEN SATURATION: 100 %

## 2021-11-01 LAB
ANION GAP SERPL CALCULATED.3IONS-SCNC: 13 MMOL/L (ref 4–16)
BUN BLDV-MCNC: 8 MG/DL (ref 6–23)
CALCIUM SERPL-MCNC: 9.2 MG/DL (ref 8.3–10.6)
CHLORIDE BLD-SCNC: 106 MMOL/L (ref 99–110)
CO2: 20 MMOL/L (ref 21–32)
CREAT SERPL-MCNC: 0.7 MG/DL (ref 0.9–1.3)
EKG ATRIAL RATE: 90 BPM
EKG DIAGNOSIS: NORMAL
EKG P AXIS: 77 DEGREES
EKG P-R INTERVAL: 150 MS
EKG Q-T INTERVAL: 360 MS
EKG QRS DURATION: 80 MS
EKG QTC CALCULATION (BAZETT): 440 MS
EKG R AXIS: 43 DEGREES
EKG T AXIS: 54 DEGREES
EKG VENTRICULAR RATE: 90 BPM
GFR AFRICAN AMERICAN: >60 ML/MIN/1.73M2
GFR NON-AFRICAN AMERICAN: >60 ML/MIN/1.73M2
GLUCOSE BLD-MCNC: 112 MG/DL (ref 70–99)
GLUCOSE BLD-MCNC: 161 MG/DL (ref 70–99)
GLUCOSE BLD-MCNC: 269 MG/DL (ref 70–99)
GLUCOSE BLD-MCNC: 52 MG/DL (ref 70–99)
GLUCOSE BLD-MCNC: 57 MG/DL (ref 70–99)
POTASSIUM SERPL-SCNC: 3.6 MMOL/L (ref 3.5–5.1)
SODIUM BLD-SCNC: 139 MMOL/L (ref 135–145)

## 2021-11-01 PROCEDURE — 82962 GLUCOSE BLOOD TEST: CPT

## 2021-11-01 PROCEDURE — 96372 THER/PROPH/DIAG INJ SC/IM: CPT

## 2021-11-01 PROCEDURE — 80048 BASIC METABOLIC PNL TOTAL CA: CPT

## 2021-11-01 PROCEDURE — G0378 HOSPITAL OBSERVATION PER HR: HCPCS

## 2021-11-01 PROCEDURE — 93010 ELECTROCARDIOGRAM REPORT: CPT | Performed by: INTERNAL MEDICINE

## 2021-11-01 PROCEDURE — 94761 N-INVAS EAR/PLS OXIMETRY MLT: CPT

## 2021-11-01 PROCEDURE — 6360000002 HC RX W HCPCS: Performed by: FAMILY MEDICINE

## 2021-11-01 PROCEDURE — 6370000000 HC RX 637 (ALT 250 FOR IP): Performed by: INTERNAL MEDICINE

## 2021-11-01 PROCEDURE — 36415 COLL VENOUS BLD VENIPUNCTURE: CPT

## 2021-11-01 RX ADMIN — ENOXAPARIN SODIUM 40 MG: 40 INJECTION SUBCUTANEOUS at 11:00

## 2021-11-01 RX ADMIN — METFORMIN HYDROCHLORIDE 500 MG: 500 TABLET ORAL at 11:00

## 2021-11-01 NOTE — DISCHARGE SUMMARY
Discharge Summary    Name:  Hever Hernandez /Age/Sex: 1970  (46 y.o. male)   MRN & CSN:  [id number] & [id number] Admission Date/Time: 10/30/2021  9:25 AM   Attending:  No att. providers found Discharging Physician: [facility] Course:   Hever Hernandez is a 46 y.o.  male  who presents with hyperglycemia. Problems addressed during this hospitalization:      Type 2 diabetes with hyperglycemia   - initial glucose on admission 286; initially with concern for DKA given pH 7.3 and elevated BHB, but bicarb normal  - patient ran out of home meds and noted glucose to by high at home over last couple weeks  -Repeat BHB and BMP WNL  - A1c 11.7 - ? If this is accurate, likely over-estimated given that patient ran out of meds recently   -Glucose improved with initiation of basal bolus regimen and resuming Metformin, then became hypoglycemic  -Endocrinology followed- recommended discharge on Metformin only given hypoglycemia with insulin  - follow-up with [facility] outpatient clinic scheduled for next week     Orthostasis   - BP remained stable, but HR aimee from 60-110s upon standing  - complains of lightheadedness   - likely secondary to dehydration in setting of hyperglycemia  -Lightheadedness and orthostasis improved this a.m. after IV fluids     Hypertension  -Was previously not taking anything for blood pressure  -Continue to monitor BP  - improved without meds - recommend outpatient follow-up     This patient was seen and examined autonomously  A hospitalist attending physician was available for questions/consultation as needed. The patient expressed appropriate understanding of and agreement with the discharge recommendations, medications, and plan.      Consults this admission:  IP CONSULT TO HOSPITALIST  IP CONSULT TO CASE MANAGEMENT  IP CONSULT TO DIETITIAN    Discharge Instruction:   Follow up appointments: PCP  Primary care physician:  within 2 weeks    Diet:  diabetic diet   Activity: activity as tolerated  Disposition: Discharged to:   [x]Home, [][facility], []SNF, []Acute Rehab, []Hospice   Condition on discharge: Stable    Discharge Medications:      Beryle Muck   Home Medication Instructions [facility]:[id number]    Printed on:11/01/21 1618   Medication Information                      metFORMIN (GLUCOPHAGE) 500 MG tablet  Take 1 tablet by mouth 2 times daily (with meals) Unsure on dosage             NONFORMULARY  \"DIAVEFEN\"                 Objective Findings at Discharge:   /70   Pulse 82   Temp 97.7 °F (36.5 °C) (Oral)   Resp 16   Ht 5' 7\" (1.702 m)   Wt 168 lb (76.2 kg)   SpO2 100%   BMI 26.31 kg/m²            PHYSICAL EXAM   GEN Awake male, sitting upright in bed in no apparent distress. Appears given age. EYES Pupils are equally round. No scleral erythema, discharge, or conjunctivitis. HENT Mucous membranes are moist.   NECK Supple, no apparent thyromegaly or masses. RESP Clear to auscultation, no wheezes, rales or rhonchi. Symmetric chest movement while on room air. CARDIO/VASC S1/S2 auscultated. Regular rate without appreciable murmurs, rubs, or gallops. Peripheral pulses equal bilaterally and palpable. No peripheral edema. GI Abdomen is soft without significant tenderness, masses, or guarding. Bowel sounds are normoactive.  No costovertebral angle tenderness. Hoskins catheter is not present. HEME/LYMPH No petechiae or ecchymoses. MSK No gross joint deformities. SKIN Normal coloration, warm, dry. NEURO Cranial nerves appear grossly intact, normal speech, no lateralizing weakness. PSYCH Awake, alert, oriented x 4. Affect appropriate.     BMP/CBC  Recent Labs     10/30/21  1000 10/31/21  0618 11/01/21  0943    136 139   K 4.0 3.9 3.6    104 106   CO2 24 22 20*   BUN 5* 7 8   CREATININE 0.6* 0.6* 0.7*   WBC 3.7* 4.8  --    HCT 45.1 43.9  --     173  --        IMAGING:      Discharge Time of 35 minutes    Electronically signed by Peña Pennington PA-C on 11/1/2021 at 4:18 PM

## 2021-11-01 NOTE — PROGRESS NOTES
Outpatient Pharmacy Progress Note for Meds-to-Beds    Total number of Prescriptions Filled: 1  The following medications were delivered to the patient or nursing unit during the discharge process:   Metformin 500mg 60    Additional Documentation:         Thank you for letting us serve your patients.   [address]    [address], [address] [facility]    Phone: [phone]    Fax: [phone]

## 2021-11-01 NOTE — CONSULTS
Nutrition Education    · Verbally reviewed information with Patient  · Educated on Diabetes Nutrition Therapy (in [place])  · Written educational materials provided. · Contact name and number provided.     Electronically signed by Ashkan Christianson RD, MAGI on 11/1/21 at 11:55 AM EDT    Contact: [phone]

## 2021-11-01 NOTE — CARE COORDINATION
Med Assist Manager received referral over the weekend for possible assistance at discharge with new medications. Please consider generic medications as patient will only be eligible for a 1x $100 voucher. We will not be able to cover insulin due to cost.    Following for discharge needs.

## 2021-11-01 NOTE — CARE COORDINATION
Received notice from [facility] that patient has discharge order. Faxed 1x voucher for the metformin. Patient is not eligible for any further assistance due to not being a [address]. However he can access [employer] and get a coupon to use at [facility] where the medication is $5.44.       Patient placed on flagged list.

## 2021-11-01 NOTE — PROGRESS NOTES
Progress Note( Dr. Kayleigh Santacruz)  11/1/2021  Subjective:   Admit Date: 10/30/2021  PCP: No primary care provider on file. Admitted For : Generalized weakness and hyperglycemia  Has ran out of  Metformin    Consulted For: Better control of blood glucose    Interval History: Feels somewhat better glucose of better control    Denies any chest pains,   Denies SOB . Denies nausea or vomiting. No new bowel or bladder symptoms.        Intake/Output Summary (Last 24 hours) at 11/1/2021 0654  Last data filed at 10/31/2021 1607  Gross per 24 hour   Intake 800 ml   Output --   Net 800 ml       DATA    CBC:   Recent Labs     10/30/21  1000 10/31/21  0618   WBC 3.7* 4.8   HGB 14.3 13.9    173    CMP:  Recent Labs     10/30/21  1000 10/31/21  0618    136   K 4.0 3.9    104   CO2 24 22   BUN 5* 7   CREATININE 0.6* 0.6*   CALCIUM 9.4 9.0   PROT 7.5 6.6   LABALBU 4.4 4.0   BILITOT 0.5 0.5   ALKPHOS 55 48   AST 23 22   ALT 26 22     Lipids: No results found for: CHOL, HDL, TRIG  Glucose:  Recent Labs     10/31/21  1606 10/31/21  1959 11/01/21  0132   POCGLU 168* 279* 57*     [id number]:  Lab Results   Component Value Date    LABA1C 11.7 10/30/2021     High Sensitivity TSH: No results found for: TSHHS  Free T3: No results found for: FT3  Free T4:No results found for: T4FREE    No orders to display        Scheduled Medicines   Medications:    metFORMIN  500 mg Oral BID WC    sodium chloride flush  5-40 mL IntraVENous 2 times per day    enoxaparin  40 mg SubCUTAneous Daily    insulin lispro  0-12 Units SubCUTAneous TID WC    insulin lispro  0-12 Units SubCUTAneous 2 times per day      Infusions:    dextrose      sodium chloride           Objective:   Vitals: /79   Pulse 70   Temp 97.8 °F (36.6 °C) (Oral)   Resp 16   Ht 5' 7\" (1.702 m)   Wt 168 lb (76.2 kg)   SpO2 100%   BMI 26.31 kg/m²   General appearance: alert and cooperative with exam  Neck: no JVD or bruit  Thyroid : Normal lobes   Lungs: Has Vesicular Breath sounds   Heart:  regular rate and rhythm  Abdomen: soft, non-tender; bowel sounds normal; no masses,  no organomegaly  Musculoskeletal: Normal  Extremities: extremities normal, , no edema  Neurologic:  Awake, alert, oriented to name, place and time. Cranial nerves II-XII are grossly intact. Motor is  intact. Sensory is intact. ,  and gait is normal.    Assessment:     Patient Active Problem List:     Hyperglycemia      Plan:     1. Reviewed POC blood glucose . Labs and X ray results   2. Reviewed Current Medicines   3. On  Correction bolus Humalog/  and Oral Hypoglycemic drugs   4.   5. Will D/C Basal NPH  Insulin regime /Monitor Blood glucose frequently   6. Modified  the dose of Insulin/ other medicines as needed   7. Patient does not have insurance or primary care Case management is making arrangement for to arrange for his medicines  8. Will follow     .      Catie Hull MD, MD

## 2022-11-13 ENCOUNTER — HOSPITAL ENCOUNTER (EMERGENCY)
Age: 52
Discharge: HOME | End: 2022-11-13
Attending: EMERGENCY MEDICINE

## 2022-11-13 VITALS
OXYGEN SATURATION: 100 % | DIASTOLIC BLOOD PRESSURE: 84 MMHG | HEIGHT: 64 IN | TEMPERATURE: 97.8 F | BODY MASS INDEX: 22.2 KG/M2 | SYSTOLIC BLOOD PRESSURE: 129 MMHG | WEIGHT: 130 LBS | RESPIRATION RATE: 16 BRPM | HEART RATE: 71 BPM

## 2022-11-13 DIAGNOSIS — E11.65 TYPE 2 DIABETES MELLITUS WITH HYPERGLYCEMIA, WITHOUT LONG-TERM CURRENT USE OF INSULIN (HCC): Primary | ICD-10-CM

## 2022-11-13 LAB
ALBUMIN SERPL-MCNC: 4.2 G/DL (ref 3.5–5.1)
ALP BLD-CCNC: 61 U/L (ref 38–126)
ALT SERPL-CCNC: 34 U/L (ref 11–66)
ANION GAP SERPL CALCULATED.3IONS-SCNC: 11 MEQ/L (ref 8–16)
AST SERPL-CCNC: 30 U/L (ref 5–40)
AVERAGE GLUCOSE: 339 MG/DL (ref 70–126)
BACTERIA: ABNORMAL /HPF
BASE EXCESS MIXED: -0.3 MMOL/L (ref -2–3)
BASOPHILS # BLD: 0.6 %
BASOPHILS ABSOLUTE: 0 THOU/MM3 (ref 0–0.1)
BILIRUB SERPL-MCNC: 0.3 MG/DL (ref 0.3–1.2)
BILIRUBIN URINE: NEGATIVE
BLOOD, URINE: NEGATIVE
BUN BLDV-MCNC: 13 MG/DL (ref 7–22)
CALCIUM SERPL-MCNC: 9.4 MG/DL (ref 8.5–10.5)
CASTS 2: ABNORMAL /LPF
CASTS UA: ABNORMAL /LPF
CHARACTER, URINE: CLEAR
CHLORIDE BLD-SCNC: 95 MEQ/L (ref 98–111)
CHP ED QC CHECK: YES
CO2: 25 MEQ/L (ref 23–33)
COLLECTED BY:: ABNORMAL
COLOR: ABNORMAL
CREAT SERPL-MCNC: 0.9 MG/DL (ref 0.4–1.2)
CRYSTALS, UA: ABNORMAL
EKG ATRIAL RATE: 76 BPM
EKG P AXIS: 70 DEGREES
EKG P-R INTERVAL: 142 MS
EKG Q-T INTERVAL: 374 MS
EKG QRS DURATION: 72 MS
EKG QTC CALCULATION (BAZETT): 420 MS
EKG R AXIS: 27 DEGREES
EKG T AXIS: 47 DEGREES
EKG VENTRICULAR RATE: 76 BPM
EOSINOPHIL # BLD: 2.2 %
EOSINOPHILS ABSOLUTE: 0.1 THOU/MM3 (ref 0–0.4)
EPITHELIAL CELLS, UA: ABNORMAL /HPF
ERYTHROCYTE [DISTWIDTH] IN BLOOD BY AUTOMATED COUNT: 12.4 % (ref 11.5–14.5)
ERYTHROCYTE [DISTWIDTH] IN BLOOD BY AUTOMATED COUNT: 36.2 FL (ref 35–45)
GFR SERPL CREATININE-BSD FRML MDRD: > 60 ML/MIN/1.73M2
GLUCOSE BLD-MCNC: 261 MG/DL (ref 70–108)
GLUCOSE BLD-MCNC: 401 MG/DL (ref 70–108)
GLUCOSE BLD-MCNC: 423 MG/DL
GLUCOSE BLD-MCNC: 423 MG/DL (ref 70–108)
GLUCOSE URINE: >= 1000 MG/DL
HBA1C MFR BLD: 13.3 % (ref 4.4–6.4)
HCO3, MIXED: 24 MMOL/L (ref 23–28)
HCT VFR BLD CALC: 45.4 % (ref 42–52)
HEMOGLOBIN: 15.2 GM/DL (ref 14–18)
IMMATURE GRANS (ABS): 0 THOU/MM3 (ref 0–0.07)
IMMATURE GRANULOCYTES: 0 %
INFLUENZA A: NOT DETECTED
INFLUENZA B: NOT DETECTED
KETONES, URINE: NEGATIVE
LEUKOCYTE ESTERASE, URINE: NEGATIVE
LYMPHOCYTES # BLD: 40.9 %
LYMPHOCYTES ABSOLUTE: 1.5 THOU/MM3 (ref 1–4.8)
MCH RBC QN AUTO: 27.1 PG (ref 26–33)
MCHC RBC AUTO-ENTMCNC: 33.5 GM/DL (ref 32.2–35.5)
MCV RBC AUTO: 80.9 FL (ref 80–94)
MISCELLANEOUS 2: ABNORMAL
MONOCYTES # BLD: 9.1 %
MONOCYTES ABSOLUTE: 0.3 THOU/MM3 (ref 0.4–1.3)
NITRITE, URINE: NEGATIVE
NUCLEATED RED BLOOD CELLS: 0 /100 WBC
O2 SAT, MIXED: 63 %
OSMOLALITY CALCULATION: 279.6 MOSMOL/KG (ref 275–300)
PCO2, MIXED VENOUS: 39 MMHG (ref 41–51)
PH UA: 7 (ref 5–9)
PH, MIXED: 7.4 (ref 7.31–7.41)
PLATELET # BLD: 196 THOU/MM3 (ref 130–400)
PMV BLD AUTO: 11.4 FL (ref 9.4–12.4)
PO2 MIXED: 33 MMHG (ref 25–40)
POTASSIUM REFLEX MAGNESIUM: 4.2 MEQ/L (ref 3.5–5.2)
PRO-BNP: 19.7 PG/ML (ref 0–900)
PROTEIN UA: ABNORMAL
RBC # BLD: 5.61 MILL/MM3 (ref 4.7–6.1)
RBC URINE: ABNORMAL /HPF
RENAL EPITHELIAL, UA: ABNORMAL
SARS-COV-2 RNA, RT PCR: NOT DETECTED
SEG NEUTROPHILS: 47.2 %
SEGMENTED NEUTROPHILS ABSOLUTE COUNT: 1.7 THOU/MM3 (ref 1.8–7.7)
SODIUM BLD-SCNC: 131 MEQ/L (ref 135–145)
SPECIFIC GRAVITY, URINE: 1.02 (ref 1–1.03)
TOTAL PROTEIN: 7.6 G/DL (ref 6.1–8)
TROPONIN T: < 0.01 NG/ML
UROBILINOGEN, URINE: 0.2 EU/DL (ref 0–1)
WBC # BLD: 3.6 THOU/MM3 (ref 4.8–10.8)
WBC UA: ABNORMAL /HPF
YEAST: ABNORMAL

## 2022-11-13 PROCEDURE — 87636 SARSCOV2 & INF A&B AMP PRB: CPT

## 2022-11-13 PROCEDURE — 83880 ASSAY OF NATRIURETIC PEPTIDE: CPT

## 2022-11-13 PROCEDURE — 6370000000 HC RX 637 (ALT 250 FOR IP): Performed by: STUDENT IN AN ORGANIZED HEALTH CARE EDUCATION/TRAINING PROGRAM

## 2022-11-13 PROCEDURE — 93005 ELECTROCARDIOGRAM TRACING: CPT | Performed by: STUDENT IN AN ORGANIZED HEALTH CARE EDUCATION/TRAINING PROGRAM

## 2022-11-13 PROCEDURE — 82803 BLOOD GASES ANY COMBINATION: CPT

## 2022-11-13 PROCEDURE — 81001 URINALYSIS AUTO W/SCOPE: CPT

## 2022-11-13 PROCEDURE — 2580000003 HC RX 258: Performed by: STUDENT IN AN ORGANIZED HEALTH CARE EDUCATION/TRAINING PROGRAM

## 2022-11-13 PROCEDURE — 93010 ELECTROCARDIOGRAM REPORT: CPT | Performed by: NUCLEAR MEDICINE

## 2022-11-13 PROCEDURE — 80053 COMPREHEN METABOLIC PANEL: CPT

## 2022-11-13 PROCEDURE — 99284 EMERGENCY DEPT VISIT MOD MDM: CPT

## 2022-11-13 PROCEDURE — 84484 ASSAY OF TROPONIN QUANT: CPT

## 2022-11-13 PROCEDURE — 82948 REAGENT STRIP/BLOOD GLUCOSE: CPT

## 2022-11-13 PROCEDURE — 83036 HEMOGLOBIN GLYCOSYLATED A1C: CPT

## 2022-11-13 PROCEDURE — 36415 COLL VENOUS BLD VENIPUNCTURE: CPT

## 2022-11-13 PROCEDURE — 85025 COMPLETE CBC W/AUTO DIFF WBC: CPT

## 2022-11-13 RX ORDER — 0.9 % SODIUM CHLORIDE 0.9 %
2000 INTRAVENOUS SOLUTION INTRAVENOUS ONCE
Status: COMPLETED | OUTPATIENT
Start: 2022-11-13 | End: 2022-11-13

## 2022-11-13 RX ADMIN — SODIUM CHLORIDE 2000 ML: 9 INJECTION, SOLUTION INTRAVENOUS at 12:55

## 2022-11-13 RX ADMIN — METFORMIN HYDROCHLORIDE 500 MG: 500 TABLET ORAL at 16:27

## 2022-11-13 ASSESSMENT — ENCOUNTER SYMPTOMS
CHEST TIGHTNESS: 0
DIARRHEA: 0
ABDOMINAL DISTENTION: 0
SINUS PRESSURE: 0
ABDOMINAL PAIN: 0
EYE ITCHING: 0
EYE PAIN: 0
EYE DISCHARGE: 0
EYE REDNESS: 0
RHINORRHEA: 0
SHORTNESS OF BREATH: 0
CONSTIPATION: 0
SINUS PAIN: 0
COLOR CHANGE: 0
NAUSEA: 0
VOMITING: 0

## 2022-11-13 ASSESSMENT — PAIN SCALES - GENERAL: PAINLEVEL_OUTOF10: 0

## 2022-11-13 NOTE — ED NOTES
Results and discharge instructions discussed w/pt using . D/c instructions provided in English.  Pt provided box lunch, [facility] called     João Dang RN  11/13/22 1945

## 2022-11-13 NOTE — ED PROVIDER NOTES
[place] ENCOUNTER          Pt Name: Alex Lopez  MRN: [id number]  [place] 1970  Date of evaluation: 11/13/2022  Treating Resident Physician: Hafsa Guerra DO  Supervising Physician: Dr. Fiordaliza Perez    History obtained from the patient. CHIEF COMPLAINT       Chief Complaint   Patient presents with    Hyperglycemia           HISTORY OF PRESENT ILLNESS    HPI  Alex Lopez is a 46 y.o. male who presents to the emergency department for evaluation of hyperglycemia, malaise, and dizziness. The patient has a past medical history of type 2 diabetes. He states that he was last at the doctor in December of last year and approximately 1 week ago ran out of his metformin. He reports feeling unwell for the past 4 days and today states that he took his blood sugar at home and noted to be 527 at home. He speaks [place] and history obtained with the assistance of an . He describes his dizziness as a sensation of the room spinning    The patient has no other acute complaints at this time. REVIEW OF SYSTEMS   Review of Systems   Constitutional:  Positive for activity change. Negative for fever. HENT:  Negative for ear pain, rhinorrhea, sinus pressure and sinus pain. Eyes:  Negative for pain, discharge, redness and itching. Respiratory:  Negative for chest tightness and shortness of breath. Cardiovascular:  Negative for chest pain and palpitations. Gastrointestinal:  Negative for abdominal distention, abdominal pain, constipation, diarrhea, nausea and vomiting. Endocrine: Negative for polydipsia, polyphagia and polyuria. Genitourinary:  Negative for dysuria, frequency, hematuria and urgency. Musculoskeletal:  Negative for arthralgias. Skin:  Negative for color change. Neurological:  Positive for dizziness. Negative for syncope.        PAST MEDICAL AND SURGICAL HISTORY     Past Medical History:   Diagnosis Date    Diabetes mellitus [facility])      Past Surgical History:   Procedure Laterality Date    CIRCUMCISION      states at age 32         MEDICATIONS   No current facility-administered medications for this encounter. Current Outpatient Medications:     NONFORMULARY, States taking cinnamon and clove for his blood sugars, Disp: , Rfl:     NONFORMULARY, States guava for blood sugars, Disp: , Rfl:     metFORMIN (GLUCOPHAGE) 500 MG tablet, Take 1 tablet by mouth 2 times daily (with meals) for 7 days, Disp: 14 tablet, Rfl: 0    NONFORMULARY, \"DIAVEFEN\", Disp: , Rfl:       SOCIAL HISTORY     Social History     Social History Narrative    Not on file     Social History     Tobacco Use    Smoking status: Never    Smokeless tobacco: Never   Substance Use Topics    Alcohol use: Not Currently    Drug use: Never         ALLERGIES   No Known Allergies      FAMILY HISTORY   History reviewed. No pertinent family history. PREVIOUS RECORDS   Previous records reviewed        PHYSICAL EXAM     ED Triage Vitals [11/13/22 1210]   BP Temp Temp Source Heart Rate Resp SpO2 Height Weight   (!) 147/92 97.8 °F (36.6 °C) Oral 84 17 98 % 5' 4\" (1.626 m) 130 lb (59 kg)     Initial vital signs and nursing assessment reviewed and abnormal from HTN . Body mass index is 22.31 kg/m². Pulsoximetry is normal per my interpretation. Additional Vital Signs:  Vitals:    11/13/22 1500   BP: 129/84   Pulse: 71   Resp: 16   Temp:    SpO2: 100%       Physical Exam  Constitutional:       General: He is not in acute distress. Appearance: Normal appearance. He is not ill-appearing. HENT:      Head: Normocephalic and atraumatic. Nose: Nose normal. No congestion or rhinorrhea. Mouth/Throat:      Mouth: Mucous membranes are moist.      Pharynx: Oropharynx is clear. No oropharyngeal exudate or posterior oropharyngeal erythema. Eyes:      Extraocular Movements: Extraocular movements intact.       Pupils: Pupils are equal, round, and reactive to light. Cardiovascular:      Rate and Rhythm: Normal rate and regular rhythm. Pulses: Normal pulses. Heart sounds: Normal heart sounds. Pulmonary:      Effort: Pulmonary effort is normal. No respiratory distress. Breath sounds: Normal breath sounds. Abdominal:      General: Abdomen is flat. There is no distension. Palpations: Abdomen is soft. Tenderness: There is no abdominal tenderness. Musculoskeletal:         General: No swelling or tenderness. Normal range of motion. Cervical back: Normal range of motion and neck supple. Right lower leg: No edema. Left lower leg: No edema. Skin:     General: Skin is warm and dry. Capillary Refill: Capillary refill takes less than 2 seconds. Neurological:      General: No focal deficit present. Mental Status: He is alert and oriented to person, place, and time. Mental status is at baseline. MEDICAL DECISION MAKING   Initial Assessment:   51-year-old male presenting the emergency department for evaluation of malaise, dizziness described as the room spinning, and hyperglycemia. Elevated point-of-care glucose of 423. Differential diagnosis includes but is not limited to: Anemia, electrolyte abnormality, ACS, arrhythmia, hypoglycemia due to type 2 diabetes, DKA, COVID-19, influenza, viral URI, medication noncompliance. Plan:   IV, labs. 2 L IV fluid bolus for hyperglycemia.         ED RESULTS   Laboratory results:  Labs Reviewed   CBC WITH AUTO DIFFERENTIAL - Abnormal; Notable for the following components:       Result Value    WBC 3.6 (*)     Segs Absolute 1.7 (*)     Monocytes Absolute 0.3 (*)     All other components within normal limits   COMPREHENSIVE METABOLIC PANEL W/ REFLEX TO MG FOR LOW K - Abnormal; Notable for the following components:    Glucose 401 (*)     Sodium 131 (*)     Chloride 95 (*)     All other components within normal limits   HEMOGLOBIN A1C - Abnormal; Notable for the following components:    Hemoglobin A1C 13.3 (*)     AVERAGE GLUCOSE 339 (*)     All other components within normal limits   BLOOD GAS, VENOUS - Abnormal; Notable for the following components:    PCO2, MIXED VENOUS 39 (*)     All other components within normal limits   URINE WITH REFLEXED MICRO - Abnormal; Notable for the following components:    Glucose, Ur >= 1000 (*)     Protein, UA TRACE (*)     All other components within normal limits   POCT GLUCOSE - Abnormal; Notable for the following components:    POC Glucose 423 (*)     All other components within normal limits   POCT GLUCOSE - Abnormal; Notable for the following components:    POC Glucose 261 (*)     All other components within normal limits   POCT GLUCOSE - Normal   COVID-19 & INFLUENZA COMBO   TROPONIN   BRAIN NATRIURETIC PEPTIDE   GLOMERULAR FILTRATION RATE, ESTIMATED   ANION GAP   OSMOLALITY       Radiologic studies results:  No orders to display       ED Medications administered this visit:   Medications   0.9 % sodium chloride bolus (0 mLs IntraVENous Stopped 11/13/22 1459)   metFORMIN (GLUCOPHAGE) tablet 500 mg (500 mg Oral Given 11/13/22 1627)         ED COURSE     ED Course as of 11/13/22 2020   Sun Nov 13, 2022   1618 Repeat POC glucose 260 after 2 L of normal saline. We will plan to discharge the patient home with one 500 mg metformin, refill the patient's metformin for 1 week, and refer the patient to the internal medicine clinic. A follow-up appointment for 1:30 PM tomorrow was scheduled via the 1-Click feature. The patient verbalized understanding and all questions were answered with the assistance of an . [DO]      ED Course User Index  [DO] Mini Ripper, DO       Strict return precautions and follow up instructions were discussed with the patient prior to discharge, with which the patient agrees. AVS discharge instructions listed below, which are documented in this note in [place] and then in [facility].   Discharge instructions in [place] were obtained via [email or web address] translate and listed on the AVS.    Do not hesitate to return to the emergency department if you are experiencing any new or worsening symptoms. Go to the internal medicine clinic tomorrow at 1:30 PM for your scheduled appointment. N'hésitez pas à retourner aux urgences si vous présentez maria luisa symptômes nouveaux ou claudine s'aggravent. Rendez-vous à la clinique de médecine interne demain à 13h30 pour votre rendez-vous prévu. MEDICATION CHANGES     Discharge Medication List as of 11/13/2022  4:18 PM            FINAL DISPOSITION     Final diagnoses:   Type 2 diabetes mellitus with hyperglycemia, without long-term current use of insulin (HCC)     Condition: condition: good  Dispo: Discharge to home      This transcription was electronically signed. Parts of this transcriptions may have been dictated by use of voice recognition software and electronically transcribed, and parts may have been transcribed with the assistance of an ED scribe. The transcription may contain errors not detected in proofreading. Please refer to my supervising physician's documentation if my documentation differs.     Electronically Signed: Mikhail Serra DO, 11/13/22, 8:20 PM          Mikhail Serra DO  Resident  11/13/22 2020

## 2022-11-13 NOTE — DISCHARGE INSTRUCTIONS
N'hésitez pas à retourner aux urgences si vous présentez maria luisa symptômes nouveaux ou claudine s'aggravent. Rica-vous à la clinique de médecine interne demain à 13h30 pour votre rica-vous prévu.

## 2022-11-14 ENCOUNTER — HOSPITAL ENCOUNTER (EMERGENCY)
Age: 52
Discharge: HOME | End: 2022-11-14
Attending: EMERGENCY MEDICINE

## 2022-11-14 ENCOUNTER — APPOINTMENT (OUTPATIENT)
Dept: ULTRASOUND IMAGING | Age: 52
End: 2022-11-14

## 2022-11-14 ENCOUNTER — OFFICE VISIT (OUTPATIENT)
Dept: INTERNAL MEDICINE CLINIC | Age: 52
End: 2022-11-14

## 2022-11-14 VITALS
HEIGHT: 63 IN | RESPIRATION RATE: 16 BRPM | HEART RATE: 76 BPM | TEMPERATURE: 97.7 F | OXYGEN SATURATION: 100 % | WEIGHT: 126 LBS | SYSTOLIC BLOOD PRESSURE: 138 MMHG | DIASTOLIC BLOOD PRESSURE: 87 MMHG | BODY MASS INDEX: 22.32 KG/M2

## 2022-11-14 VITALS
DIASTOLIC BLOOD PRESSURE: 80 MMHG | BODY MASS INDEX: 21.65 KG/M2 | WEIGHT: 126.8 LBS | HEART RATE: 84 BPM | HEIGHT: 64 IN | SYSTOLIC BLOOD PRESSURE: 136 MMHG

## 2022-11-14 DIAGNOSIS — Z79.4 TYPE 2 DIABETES MELLITUS WITHOUT COMPLICATION, WITH LONG-TERM CURRENT USE OF INSULIN (HCC): Primary | ICD-10-CM

## 2022-11-14 DIAGNOSIS — R51.9 NONINTRACTABLE HEADACHE, UNSPECIFIED CHRONICITY PATTERN, UNSPECIFIED HEADACHE TYPE: ICD-10-CM

## 2022-11-14 DIAGNOSIS — E04.1 THYROID NODULE: ICD-10-CM

## 2022-11-14 DIAGNOSIS — E11.9 TYPE 2 DIABETES MELLITUS WITHOUT COMPLICATION, WITH LONG-TERM CURRENT USE OF INSULIN (HCC): Primary | ICD-10-CM

## 2022-11-14 DIAGNOSIS — R22.1 MASS IN NECK: ICD-10-CM

## 2022-11-14 DIAGNOSIS — R73.9 HYPERGLYCEMIA: Primary | ICD-10-CM

## 2022-11-14 LAB
ANION GAP SERPL CALCULATED.3IONS-SCNC: 11 MEQ/L (ref 8–16)
BASOPHILS # BLD: 0.4 %
BASOPHILS ABSOLUTE: 0 THOU/MM3 (ref 0–0.1)
BUN BLDV-MCNC: 13 MG/DL (ref 7–22)
CALCIUM SERPL-MCNC: 9.6 MG/DL (ref 8.5–10.5)
CHLORIDE BLD-SCNC: 99 MEQ/L (ref 98–111)
CHP ED QC CHECK: ABNORMAL
CO2: 25 MEQ/L (ref 23–33)
CREAT SERPL-MCNC: 1 MG/DL (ref 0.4–1.2)
EOSINOPHIL # BLD: 2 %
EOSINOPHILS ABSOLUTE: 0.1 THOU/MM3 (ref 0–0.4)
ERYTHROCYTE [DISTWIDTH] IN BLOOD BY AUTOMATED COUNT: 12.7 % (ref 11.5–14.5)
ERYTHROCYTE [DISTWIDTH] IN BLOOD BY AUTOMATED COUNT: 37.6 FL (ref 35–45)
GFR SERPL CREATININE-BSD FRML MDRD: > 60 ML/MIN/1.73M2
GLUCOSE BLD-MCNC: 167 MG/DL
GLUCOSE BLD-MCNC: 167 MG/DL (ref 70–108)
GLUCOSE BLD-MCNC: 254 MG/DL
GLUCOSE BLD-MCNC: 254 MG/DL (ref 70–108)
GLUCOSE BLD-MCNC: 329 MG/DL
GLUCOSE BLD-MCNC: 329 MG/DL (ref 70–108)
GLUCOSE BLD-MCNC: 362 MG/DL (ref 70–108)
GLUCOSE BLD-MCNC: 399 MG/DL
HCT VFR BLD CALC: 48.1 % (ref 42–52)
HEMOGLOBIN: 15.6 GM/DL (ref 14–18)
IMMATURE GRANS (ABS): 0.01 THOU/MM3 (ref 0–0.07)
IMMATURE GRANULOCYTES: 0.2 %
LYMPHOCYTES # BLD: 40.1 %
LYMPHOCYTES ABSOLUTE: 2 THOU/MM3 (ref 1–4.8)
MCH RBC QN AUTO: 26.6 PG (ref 26–33)
MCHC RBC AUTO-ENTMCNC: 32.4 GM/DL (ref 32.2–35.5)
MCV RBC AUTO: 81.9 FL (ref 80–94)
MONOCYTES # BLD: 7.4 %
MONOCYTES ABSOLUTE: 0.4 THOU/MM3 (ref 0.4–1.3)
NUCLEATED RED BLOOD CELLS: 0 /100 WBC
PLATELET # BLD: 200 THOU/MM3 (ref 130–400)
PMV BLD AUTO: 11.6 FL (ref 9.4–12.4)
POTASSIUM SERPL-SCNC: 5.3 MEQ/L (ref 3.5–5.2)
RBC # BLD: 5.87 MILL/MM3 (ref 4.7–6.1)
SEG NEUTROPHILS: 49.9 %
SEGMENTED NEUTROPHILS ABSOLUTE COUNT: 2.4 THOU/MM3 (ref 1.8–7.7)
SODIUM BLD-SCNC: 135 MEQ/L (ref 135–145)
T4 FREE: 1.36 NG/DL (ref 0.93–1.76)
TSH SERPL DL<=0.05 MIU/L-ACNC: 0.73 UIU/ML (ref 0.4–4.2)
WBC # BLD: 4.9 THOU/MM3 (ref 4.8–10.8)

## 2022-11-14 PROCEDURE — 6370000000 HC RX 637 (ALT 250 FOR IP): Performed by: EMERGENCY MEDICINE

## 2022-11-14 PROCEDURE — 84443 ASSAY THYROID STIM HORMONE: CPT

## 2022-11-14 PROCEDURE — 36415 COLL VENOUS BLD VENIPUNCTURE: CPT

## 2022-11-14 PROCEDURE — 96360 HYDRATION IV INFUSION INIT: CPT

## 2022-11-14 PROCEDURE — 85025 COMPLETE CBC W/AUTO DIFF WBC: CPT

## 2022-11-14 PROCEDURE — 84439 ASSAY OF FREE THYROXINE: CPT

## 2022-11-14 PROCEDURE — 99204 OFFICE O/P NEW MOD 45 MIN: CPT

## 2022-11-14 PROCEDURE — 80048 BASIC METABOLIC PNL TOTAL CA: CPT

## 2022-11-14 PROCEDURE — 3046F HEMOGLOBIN A1C LEVEL >9.0%: CPT

## 2022-11-14 PROCEDURE — 82962 GLUCOSE BLOOD TEST: CPT | Performed by: INTERNAL MEDICINE

## 2022-11-14 PROCEDURE — 99284 EMERGENCY DEPT VISIT MOD MDM: CPT | Performed by: EMERGENCY MEDICINE

## 2022-11-14 PROCEDURE — 96372 THER/PROPH/DIAG INJ SC/IM: CPT

## 2022-11-14 PROCEDURE — 96361 HYDRATE IV INFUSION ADD-ON: CPT

## 2022-11-14 PROCEDURE — 76536 US EXAM OF HEAD AND NECK: CPT

## 2022-11-14 PROCEDURE — 2580000003 HC RX 258: Performed by: EMERGENCY MEDICINE

## 2022-11-14 PROCEDURE — 82948 REAGENT STRIP/BLOOD GLUCOSE: CPT

## 2022-11-14 RX ORDER — 0.9 % SODIUM CHLORIDE 0.9 %
1000 INTRAVENOUS SOLUTION INTRAVENOUS ONCE
Status: COMPLETED | OUTPATIENT
Start: 2022-11-14 | End: 2022-11-14

## 2022-11-14 RX ORDER — GLUCOSAMINE HCL/CHONDROITIN SU 500-400 MG
CAPSULE ORAL
Qty: 100 STRIP | Refills: 0 | Status: SHIPPED | OUTPATIENT
Start: 2022-11-14

## 2022-11-14 RX ORDER — MAGNESIUM SULFATE 1 G/100ML
1000 INJECTION INTRAVENOUS PRN
Status: DISCONTINUED | OUTPATIENT
Start: 2022-11-14 | End: 2022-11-14 | Stop reason: HOSPADM

## 2022-11-14 RX ORDER — DEXTROSE AND SODIUM CHLORIDE 5; .45 G/100ML; G/100ML
INJECTION, SOLUTION INTRAVENOUS CONTINUOUS PRN
Status: DISCONTINUED | OUTPATIENT
Start: 2022-11-14 | End: 2022-11-14 | Stop reason: HOSPADM

## 2022-11-14 RX ORDER — BLOOD-GLUCOSE METER
1 KIT MISCELLANEOUS DAILY
Qty: 1 KIT | Refills: 0 | Status: SHIPPED | OUTPATIENT
Start: 2022-11-14

## 2022-11-14 RX ORDER — POTASSIUM CHLORIDE 7.45 MG/ML
10 INJECTION INTRAVENOUS PRN
Status: DISCONTINUED | OUTPATIENT
Start: 2022-11-14 | End: 2022-11-14 | Stop reason: HOSPADM

## 2022-11-14 RX ORDER — LANCETS 30 GAUGE
1 EACH MISCELLANEOUS 4 TIMES DAILY
Qty: 100 EACH | Refills: 1 | Status: SHIPPED | OUTPATIENT
Start: 2022-11-14

## 2022-11-14 RX ORDER — SODIUM CHLORIDE 9 MG/ML
INJECTION, SOLUTION INTRAVENOUS CONTINUOUS
Status: DISCONTINUED | OUTPATIENT
Start: 2022-11-14 | End: 2022-11-14 | Stop reason: HOSPADM

## 2022-11-14 RX ADMIN — SODIUM CHLORIDE 1000 ML: 9 INJECTION, SOLUTION INTRAVENOUS at 16:06

## 2022-11-14 RX ADMIN — DEXTROSE AND SODIUM CHLORIDE: 5; 450 INJECTION, SOLUTION INTRAVENOUS at 18:33

## 2022-11-14 RX ADMIN — Medication 4 UNITS: at 16:18

## 2022-11-14 ASSESSMENT — ENCOUNTER SYMPTOMS
EYE PAIN: 0
RHINORRHEA: 0
SHORTNESS OF BREATH: 0
EYE REDNESS: 0
DIARRHEA: 0
VOMITING: 0
WHEEZING: 0
COLOR CHANGE: 0

## 2022-11-14 ASSESSMENT — PAIN - FUNCTIONAL ASSESSMENT: PAIN_FUNCTIONAL_ASSESSMENT: NONE - DENIES PAIN

## 2022-11-14 NOTE — PROGRESS NOTES
1970    Chief Complaint   Patient presents with    Follow-Up from Hospital     Follow up from ER on 11/13/22 diabetes--AM blood sugar 230 this am and 400 after eating this afternoon-has dizziness with elevated BS    Swelling     Left neck-painful     Patient is [address] speaking, spoke and interviewed with the patient with  ID number is [id number]. HPI and physical exam is limited due to interrupted with interpreters  Pt is a 46 y.o. male who presents for an initial visit. Patient was seen in ED yesterday, due to hyperglycemia, and patient is found hemoglobin A1c 13.3, glucose 400 and patient was treated with IV fluids and sent home with metformin 500 mg twice daily. However patient could not take medications since he has no insurance and he also cannot control his diet because of his social living in situation. HPI  Currently patient complains of headache, neck ache, neck lump, nausea and fatigue, bilateral pain on feet. Patient states he has diabetes mellitus about 2 years, he is a recent immigrant without documents, he was taking metformin 1 prior to coming to the [address] [place] [address],[address] and past couple months this he was inconsistent taking metformin and he was not following diabetic diet. Headache is continuous, but very slight with a worsening throughout the day, present for couple months since, 6 out of 10 currently, mostly located in occipital area which radiates to neck. Patient denies triggers, alleviating factors, denies travel, denies previous brain infections, but states that lightheadedness dizziness on and off each other day for past 2 months. Patient also mentioned about neck lump which is present for 1 year and  slowly increasing in size and patient thinks this contributes to the headache.     Currently patient denies fever chills night sweats, productive cough, hemoptysis, heart palpitations, chest pain, diarrhea constipation, dysuria      Past Medical History:   Diagnosis Date    Diabetes mellitus ([facility] [address]        Past Surgical History:   Procedure Laterality Date    CIRCUMCISION      states at age 32       Current Outpatient Medications   Medication Sig Dispense Refill    metFORMIN (GLUCOPHAGE) 500 MG tablet Take 1 tablet by mouth 2 times daily (with meals) 180 tablet 1    glucose monitoring (FREESTYLE FREEDOM) kit 1 kit by Does not apply route daily 1 kit 0    Lancets MISC 1 each by Does not apply route 4 times daily 100 each 1    blood glucose monitor strips Test 4 times a day & as needed for symptoms of irregular blood glucose. Dispense sufficient amount for indicated testing frequency plus additional to accommodate PRN testing needs. 100 strip 0    NONFORMULARY States taking cinnamon and clove for his blood sugars (Patient not taking: Reported on 11/14/2022)       No current facility-administered medications for this visit. No Known Allergies    Social History     Socioeconomic History    Marital status:      Spouse name: Not on file    Number of children: Not on file    Years of education: Not on file    Highest education level: Not on file   Occupational History    Not on file   Tobacco Use    Smoking status: Never    Smokeless tobacco: Never   Vaping Use    Vaping Use: Never used   Substance and Sexual Activity    Alcohol use: Not Currently    Drug use: Never    Sexual activity: Not on file   Other Topics Concern    Not on file   Social History Narrative    Not on file     Social Determinants of Health     Financial Resource Strain: Not on file   Food Insecurity: Not on file   Transportation Needs: Not on file   Physical Activity: Not on file   Stress: Not on file   Social Connections: Not on file   Intimate Partner Violence: Not on file   Housing Stability: Not on file       No family history on file.     Review of Systems - General ROS: n positive for lightheadedness fatigue headache neck ache, neck lump  Psychological ROS: negative for - anxiety and depression  Hematological and Lymphatic ROS: No history of blood clots or bleeding disorder. Respiratory ROS: no cough, shortness of breath, or wheezing  Cardiovascular ROS: no chest pain or dyspnea on exertion, tolerates a flight of stairs, vacuuming  Gastrointestinal ROS: no abdominal pain, change in bowel habits, or black or bloody stools  Genito-Urinary ROS: no dysuria, trouble voiding, or hematuria  Musculoskeletal ROS: negative for - muscle pain or muscular weakness, positive fatigue, leg pain  Neurological ROS: negative for - headaches, numbness/tingling, seizures or weakness  Dermatological ROS: negative for - rash or skin lesion changes    Blood pressure 136/80, pulse 84, height 5' 3.78\" (1.62 m), weight 126 lb 12.8 oz (57.5 kg). Physical Examination: General appearance -alert, well appearing, and in no distress  Mental status - alert, oriented to person, place, and time  Head - atraumatic, normocephalic  Eyes - pupils equal and reactive to light, extraocular muscles intact  Ears - external ears are normal, hearing is grossly normal  Mouth - oral pharynx clear, mucous membranes moist  Neck - supple. Seen nonmobile, firm mass that feels enlarged thyroid  Chest - clear to auscultation, no wheezes, rales or rhonchi, symmetric air entry  Heart - normal rate, regular rhythm, normal S1, S2, no murmurs, rubs, clicks or gallops  Abdomen -soft, nontender, nondistended  Neurological - alert, oriented, cranial nerves II-XII intact, motor and sensation are grossly intact bilateral upper and lower extremities. Extremities - peripheral pulses normal, no pedal edema, no clubbing or cyanosis  Skin - warm and dry    Diagnostic data:   I have reviewed recent diagnostic testing including labs with patient today. Lab in ED (11/13/22)  UA Glu>1000, protein-trace, negative for wbc/RBC/casts/bacteria. CMP.- Glucose- 401, Cr-0.9, BUN-13, Na-131, K-4.2, Chloride-95, Co2-25, Ast-30. ALT-34.   CBC- wbc- 3.6, RBC-5.6, Hb-15, MCV-81, Platelet-196  WVBY0E-30.4, Troponin - negative  Covid-19 and rapid influenza negative. Anion gap-11. 0  BNP-19.7  Assessment and Plan:     Diagnosis Orders   1. Type 2 diabetes mellitus without complication, with long-term current use of insulin ([facility])  POCT Glucose    metFORMIN (GLUCOPHAGE) 500 MG tablet    glucose monitoring (FREESTYLE FREEDOM) kit    Lancets MISC    blood glucose monitor strips      2. Mass in neck        3. Nonintractable headache, unspecified chronicity pattern, unspecified headache type          Uncontrolled diabetes mellitus, suspected diabetic neuropathy bilateral lower extremities.   -Patient is educated and informed regarding diet, physical activities and taking medications regularly on time.  -Sending patient to ED since POC glucose is found 399, and headache which is associated with a neck ache pain and neck mass    -Patient is educated and interpreted today to get blood sugar every day 4 times a day and write it down and patient is given a form to fill out for the glucose reading findings  -Patient is instructed to take metformin 500 mg twice daily, how to get the [address] for free medication from [facility].  Also given prescription for glucagon 1 and 100 stretches and 100 lancets to check blood sugar 4 times a day and write it down  -Highly recommended dietitian consult for diet, will arrange near future if patient compliance with visits and recommendations  -Highly recommended  since patient will need social help, and Medicaid application , TNAP? SNAP for  other food assistance. Patient also may need shelter since he is living with his friends, and no jobs and no money to afford his food and other basic needs to survive. 2. Neck mass. Suspected thyroid enlargement. -Recommended CT scan of the neck, thyroid plan, hepatic panel. Headache unspecified.   Likely due to hyperglycemia vs neck mass       Staffed with: Dr Nataliia Bagley PGY-2  [place] INTERNAL MEDICINE  [address]  Dept: [phone]  Dept Fax: [phone] : [phone]

## 2022-11-14 NOTE — ED TRIAGE NOTES
Pt arrives from lobby via wheelchair with c/o hyperglycemia. Pt states he was sent by his PCP. Pt reports his glucose was 266 yesterday and he was given metformin. Pt states his glucose was 250 today. Pt denies NVD or increased urinary frequency. Pt also reports feeling a lump in his neck that has been increasing over the last 2-3 years.  used to speak with pt.

## 2022-11-14 NOTE — ED PROVIDER NOTES
[place] ENCOUNTER          Pt Name: Shannen Toussaint  MRN: [id number]  [place] 1970  Date of evaluation: 11/14/2022  Treating Resident Physician: Rosalba Magdaleno DO  Supervising Physician: Peña Duckworth DO    History obtained from the patient. CHIEF COMPLAINT       Chief Complaint   Patient presents with    Hyperglycemia    Neck Swelling           HISTORY OF PRESENT ILLNESS    HPI  Shannen Toussaint is a 46 y.o. male with a h/o uncontrolled DM who presents to the emergency department from the outpatient resident's clinic for evaluation of hyperglycemia. Their glucometer read 400+. Patient is an immigrant without any documents, he does not have any insurance. The outpatient clinic ordered some metformin, and a glucometer for him to  for free at the pharmacy here. Patient notes he has been very hungry lately and has been having muscle cramps. He has this left anterior neck mass that started 2 years ago while he was in [facility], states has been slowly growing since then, is able to swallow without pain. He denies any chest pain, difficulty breathing, fever, nausea, vomiting. The patient has no other acute complaints at this time. REVIEW OF SYSTEMS   Review of Systems   Constitutional:  Positive for fatigue. Negative for chills and fever. HENT:  Negative for congestion and rhinorrhea. Eyes:  Negative for pain and redness. Respiratory:  Negative for shortness of breath and wheezing. Cardiovascular:  Negative for chest pain and leg swelling. Gastrointestinal:  Negative for diarrhea and vomiting. Endocrine: Positive for polyphagia. Negative for polydipsia and polyuria. Genitourinary:  Negative for difficulty urinating and dysuria. Musculoskeletal:  Positive for myalgias. Negative for gait problem and joint swelling. Skin:  Negative for color change and pallor.    Neurological:  Negative for facial asymmetry and speech difficulty. PAST MEDICAL AND SURGICAL HISTORY     Past Medical History:   Diagnosis Date    Diabetes mellitus ([facility] [address]      Past Surgical History:   Procedure Laterality Date    CIRCUMCISION      states at age 32         MEDICATIONS     Current Facility-Administered Medications:     potassium chloride 10 mEq/100 mL IVPB (Peripheral Line), 10 mEq, IntraVENous, PRN, Aaron Mires, DO    magnesium sulfate 1000 mg in dextrose 5% 100 mL IVPB, 1,000 mg, IntraVENous, PRN, [place] Mires, DO    sodium phosphate 10 mmol in sodium chloride 0.9 % 250 mL IVPB, 10 mmol, IntraVENous, PRN **OR** sodium phosphate 15 mmol in dextrose 5 % 250 mL IVPB, 15 mmol, IntraVENous, PRN **OR** sodium phosphate 20 mmol in dextrose 5 % 500 mL IVPB, 20 mmol, IntraVENous, PRN, Aaron Mires, DO    0.9 % sodium chloride infusion, , IntraVENous, Continuous, [place] Mires, DO    dextrose 5 % and 0.45 % sodium chloride infusion, , IntraVENous, Continuous PRN, Aaron Mires, DO, Stopped at 11/14/22 1902    Current Outpatient Medications:     metFORMIN (GLUCOPHAGE) 500 MG tablet, Take 1 tablet by mouth 2 times daily (with meals), Disp: 180 tablet, Rfl: 1    glucose monitoring (FREESTYLE FREEDOM) kit, 1 kit by Does not apply route daily, Disp: 1 kit, Rfl: 0    Lancets MISC, 1 each by Does not apply route 4 times daily, Disp: 100 each, Rfl: 1    blood glucose monitor strips, Test 4 times a day & as needed for symptoms of irregular blood glucose. Dispense sufficient amount for indicated testing frequency plus additional to accommodate PRN testing needs. , Disp: 100 strip, Rfl: 0    NONFORMULARY, States taking cinnamon and clove for his blood sugars (Patient not taking: Reported on 11/14/2022), Disp: , Rfl:       SOCIAL HISTORY     Social History     Social History Narrative    Not on file     Social History     Tobacco Use    Smoking status: Never    Smokeless tobacco: Never   Vaping Use    Vaping Use: Never used   Substance Use Topics Alcohol use: Not Currently    Drug use: Never         ALLERGIES   No Known Allergies      FAMILY HISTORY   History reviewed. No pertinent family history. PREVIOUS RECORDS   Previous records reviewed:  Last seen last night for hyperglycemia . PHYSICAL EXAM     ED Triage Vitals [11/14/22 1541]   BP Temp Temp Source Heart Rate Resp SpO2 Height Weight   (!) 155/83 -- Oral 81 18 100 % 5' 3\" (1.6 m) 126 lb (57.2 kg)     Initial vital signs and nursing assessment reviewed and abnormal from blood pressure 155/83 . Body mass index is 22.32 kg/m². Pulsoximetry is normal per my interpretation. Additional Vital Signs:  Vitals:    11/14/22 1835   BP: 138/87   Pulse: 76   Resp: 16   Temp:    SpO2: 100%       Physical Exam  Vitals and nursing note reviewed. Constitutional:       General: He is not in acute distress. Appearance: He is not ill-appearing or toxic-appearing. HENT:      Head: Normocephalic and atraumatic. Right Ear: External ear normal.      Left Ear: External ear normal.      Nose: Nose normal. No congestion. Mouth/Throat:      Mouth: Mucous membranes are dry. Pharynx: Oropharynx is clear. Eyes:      Comments: Slightly pale conjunctiva with no scleral icterus   Neck:      Comments: Small, mildly firm, mobile mass, left anterior neck, non-tender  Cardiovascular:      Rate and Rhythm: Normal rate and regular rhythm. Pulses: Normal pulses. Heart sounds: Normal heart sounds. Pulmonary:      Effort: Pulmonary effort is normal. No respiratory distress. Breath sounds: Normal breath sounds. No wheezing. Abdominal:      General: There is no distension. Palpations: Abdomen is soft. Tenderness: There is no abdominal tenderness. There is no guarding. Musculoskeletal:         General: Normal range of motion. Cervical back: Normal range of motion and neck supple. No tenderness. Skin:     General: Skin is warm and dry.       Capillary Refill: Capillary refill takes less than 2 seconds. Neurological:      General: No focal deficit present. Mental Status: He is alert and oriented to person, place, and time. Psychiatric:         Mood and Affect: Mood normal.           MEDICAL DECISION MAKING   Initial Assessment:   Patient is a dry but nontoxic-appearing 49-year-old male, [place] speaking, who presents from the internal medicine clinic with hyperglycemia and for evaluation of a neck mass. He is complaining of cramps, which I believe are due to dehydration and his hyperglycemia. I expect the patient will end up being discharged and he will need to get metformin and glucometer, Dr Jessica Arellano has already ordered these. Pt is looking much better after receiving IVF, glucose is down to 167. I think his hyperglycemia is 2/2 uncontrolled DM, Pt has not been taking any medications. Shavonne Reina, the  was able to  the Pt's Metformin from the pharmacy. Thyroid nodules noted on U/S, I think Pt will need f/u by an endocrinologist.     Ddx: Hyperglycemia, thyroid mass, hypothyroidism, hyperthyroidism, electrolyte abnormality, dehydration, DKA, HHS.   Plan:   Labs  IVF  US Thyroid  4 units insulin  Dextrose  F/u with endocrinologist and PCP        ED RESULTS   Laboratory results:  Labs Reviewed   BASIC METABOLIC PANEL - Abnormal; Notable for the following components:       Result Value    Potassium 5.3 (*)     Glucose 362 (*)     All other components within normal limits   POCT GLUCOSE - Abnormal   POCT GLUCOSE - Abnormal; Notable for the following components:    POC Glucose 329 (*)     All other components within normal limits   POCT GLUCOSE - Abnormal   POCT GLUCOSE - Abnormal; Notable for the following components:    POC Glucose 254 (*)     All other components within normal limits   POCT GLUCOSE - Abnormal   POCT GLUCOSE - Abnormal; Notable for the following components:    POC Glucose 167 (*)     All other components within normal limits   CBC WITH AUTO DIFFERENTIAL   GLOMERULAR FILTRATION RATE, ESTIMATED   TSH   T4, FREE   ANION GAP   POCT GLUCOSE   POCT GLUCOSE   POCT GLUCOSE   POCT GLUCOSE   POCT GLUCOSE   POCT GLUCOSE   POCT GLUCOSE   POCT GLUCOSE   POCT GLUCOSE   POCT GLUCOSE       Radiologic studies results:  US THYROID   Final Result      1. Multiple nodules throughout the right and left lobes of the thyroid gland and in the isthmus. 2. Large nodule in the left lobe measuring 5.6 x 3.9 x 2.5 cm in size. Ultrasound-guided aspiration recommended. 3. Slightly heterogeneous echogenicity in the right and left lobes of the thyroid gland. No hypervascularity. **This report has been created using voice recognition software. It may contain minor errors which are inherent in voice recognition technology. **      Final report electronically signed by DR Randy Vicente on 11/14/2022 4:56 PM          ED Medications administered this visit:   Medications   potassium chloride 10 mEq/100 mL IVPB (Peripheral Line) (has no administration in time range)   magnesium sulfate 1000 mg in dextrose 5% 100 mL IVPB (has no administration in time range)   sodium phosphate 10 mmol in sodium chloride 0.9 % 250 mL IVPB (has no administration in time range)     Or   sodium phosphate 15 mmol in dextrose 5 % 250 mL IVPB (has no administration in time range)     Or   sodium phosphate 20 mmol in dextrose 5 % 500 mL IVPB (has no administration in time range)   0.9 % sodium chloride infusion ( IntraVENous Not Given 11/14/22 1828)   dextrose 5 % and 0.45 % sodium chloride infusion ( IntraVENous Stopped 11/14/22 1902)   0.9 % sodium chloride bolus (0 mLs IntraVENous Stopped 11/14/22 1720)   insulin regular (HUMULIN R;NOVOLIN R) injection 4 Units (4 Units SubCUTAneous Given 11/14/22 1618)         ED COURSE     ED Course as of 11/14/22 1928   Mon Nov 14, 2022   1603 Dr Karo Mathis called to tell me about the patient. He said he expected the patient to end up being discharged.  He wanted his blood sugar treated and his neck mass evaluated [AC]      ED Course User Index  [AC] Latosha Umanzor DO         Strict return precautions and follow up instructions were discussed with the patient prior to discharge, with which the patient agrees. MEDICATION CHANGES     Discharge Medication List as of 11/14/2022  6:49 PM            FINAL DISPOSITION     Final diagnoses:   Hyperglycemia   Thyroid nodule     Condition: condition: fair  Dispo: Discharge to home      This transcription was electronically signed. Parts of this transcriptions may have been dictated by use of voice recognition software and electronically transcribed, and parts may have been transcribed with the assistance of an ED scribe. The transcription may contain errors not detected in proofreading. Please refer to my supervising physician's documentation if my documentation differs.     Electronically Signed: Latosha Umanzor DO, 11/14/22, 7:28 PM        Latosha Umanzor DO  Resident  11/14/22 3561

## 2022-11-14 NOTE — ED PROVIDER NOTES
I performed a history and physical examination of the patient and discussed management with the resident. I reviewed the residents note and agree with the documented findings and plan of care. Any areas of disagreement are noted on the chart. I was personally present for the key portions of any procedures. I have documented in the chart those procedures where I was not present during the key portions. I have reviewed the emergency nurses triage note. I agree with the chief complaint, past medical history, past surgical history, allergies, medications, social and family history as documented unless otherwise noted below. Documentation of the HPI, Physical Exam and Medical Decision Making performed by medical students or scribes is based on my personal performance of the HPI, PE and MDM. For Phys Assistant/ Nurse Practitioner cases/documentation I have personally evaluated this patient and have completed at least one if not all key elements of the E/M (history, physical exam, and MDM). My findings are as noted below     Patient was sent in by the family clinic. Patient's glucose was 266. He was given metformin. Today was 250. They saw him and wanted him to be evaluated. Patient speaks [place],  was used. Patient also has a nodule on the side of his neck. They wanted that evaluated as well. Patient is not complaining of any pain or problems. Patient is otherwise resting comfortably on the cot no apparent distress no other physical complaints at this time. US THYROID   Final Result      1. Multiple nodules throughout the right and left lobes of the thyroid gland and in the isthmus. 2. Large nodule in the left lobe measuring 5.6 x 3.9 x 2.5 cm in size. Ultrasound-guided aspiration recommended. 3. Slightly heterogeneous echogenicity in the right and left lobes of the thyroid gland. No hypervascularity. **This report has been created using voice recognition software.  It may contain minor errors which are inherent in voice recognition technology. **      Final report electronically signed by DR Crystal Petit on 11/14/2022 4:56 PM        Labs Reviewed   BASIC METABOLIC PANEL - Abnormal; Notable for the following components:       Result Value    Potassium 5.3 (*)     Glucose 362 (*)     All other components within normal limits   POCT GLUCOSE - Abnormal   POCT GLUCOSE - Abnormal; Notable for the following components:    POC Glucose 329 (*)     All other components within normal limits   POCT GLUCOSE - Abnormal   POCT GLUCOSE - Abnormal; Notable for the following components:    POC Glucose 254 (*)     All other components within normal limits   POCT GLUCOSE - Abnormal   POCT GLUCOSE - Abnormal; Notable for the following components:    POC Glucose 167 (*)     All other components within normal limits   CBC WITH AUTO DIFFERENTIAL   GLOMERULAR FILTRATION RATE, ESTIMATED   TSH   T4, FREE   ANION GAP   POCT GLUCOSE   POCT GLUCOSE   POCT GLUCOSE   POCT GLUCOSE   POCT GLUCOSE   POCT GLUCOSE   POCT GLUCOSE   POCT GLUCOSE   POCT GLUCOSE   POCT GLUCOSE   POCT GLUCOSE         Final diagnoses:   Hyperglycemia   Thyroid nodule   . Patient is a patient with Dr. Romy Draper the resident and agree with his assessment and plan.      Watson Friedman DO  11/14/22 2009

## 2022-11-14 NOTE — PROGRESS NOTES
1970    Chief Complaint   Patient presents with    Follow-Up from Hospital     Follow up from ER on 11/13/22 diabetes--AM blood sugar 230 this am and 400 after eating this afternoon-has dizziness with elevated BS    Swelling     Left neck-painful     Patient is [address] speaking, spoke and interviewed with the patient with  ID number is [id number]. HPI and physical exam is limited due to interrupted with interpreters  Pt is a 46 y.o. male who presents for an initial visit. Patient was seen in ED yesterday, due to hyperglycemia, and patient is found hemoglobin A1c 13.3, glucose 400 and patient was treated with IV fluids and sent home with metformin 500 mg twice daily. However patient could not take medications since he has no insurance and he also cannot control his diet because of his social living in situation. HPI  Currently patient complains of headache, neck ache, neck lump, nausea and fatigue, bilateral pain on feet. Patient states he has diabetes mellitus about 2 years, he is a recent immigrant without documents, he was taking metformin 1 prior to coming to the [address] [place] [address] and past couple months this he was inconsistent taking metformin and he was not following diabetic diet. Headache is continuous, but very slight with a worsening throughout the day, present for couple months since, 6 out of 10 currently, mostly located in occipital area which radiates to neck. Patient denies triggers, alleviating factors, denies travel, denies previous brain infections, but states that lightheadedness dizziness on and off each other day for past 2 months. Patient also mentioned about neck lump which is present for 1 year and  slowly increasing in size and patient thinks this contributes to the headache.     Currently patient denies fever chills night sweats, productive cough, hemoptysis, heart palpitations, chest pain, diarrhea constipation, dysuria      Past Medical History:   Diagnosis Date    Diabetes mellitus [facility])        Past Surgical History:   Procedure Laterality Date    CIRCUMCISION      states at age 32       No current facility-administered medications for this visit. Current Outpatient Medications   Medication Sig Dispense Refill    metFORMIN (GLUCOPHAGE) 500 MG tablet Take 1 tablet by mouth 2 times daily (with meals) 180 tablet 1    glucose monitoring (FREESTYLE FREEDOM) kit 1 kit by Does not apply route daily 1 kit 0    Lancets MISC 1 each by Does not apply route 4 times daily 100 each 1    blood glucose monitor strips Test 4 times a day & as needed for symptoms of irregular blood glucose. Dispense sufficient amount for indicated testing frequency plus additional to accommodate PRN testing needs.  100 strip 0    NONFORMULARY States taking cinnamon and clove for his blood sugars (Patient not taking: Reported on 11/14/2022)       Facility-Administered Medications Ordered in Other Visits   Medication Dose Route Frequency Provider Last Rate Last Admin    0.9 % sodium chloride bolus  1,000 mL IntraVENous Once Jerry Shields .6 mL/hr at 11/14/22 1606 1,000 mL at 11/14/22 1606    insulin regular (HUMULIN R;NOVOLIN R) injection 4 Units  4 Units SubCUTAneous Once Jerry Shields, DO           No Known Allergies    Social History     Socioeconomic History    Marital status:      Spouse name: Not on file    Number of children: Not on file    Years of education: Not on file    Highest education level: Not on file   Occupational History    Not on file   Tobacco Use    Smoking status: Never    Smokeless tobacco: Never   Vaping Use    Vaping Use: Never used   Substance and Sexual Activity    Alcohol use: Not Currently    Drug use: Never    Sexual activity: Not on file   Other Topics Concern    Not on file   Social History Narrative    Not on file     Social Determinants of Health     Financial Resource Strain: Not on file   Food Insecurity: Not on file   Transportation Needs: Not on file   Physical Activity: Not on file   Stress: Not on file   Social Connections: Not on file   Intimate Partner Violence: Not on file   Housing Stability: Not on file       No family history on file. Review of Systems - General ROS: n positive for lightheadedness fatigue headache neck ache, neck lump  Psychological ROS: negative for - anxiety and depression  Hematological and Lymphatic ROS: No history of blood clots or bleeding disorder. Respiratory ROS: no cough, shortness of breath, or wheezing  Cardiovascular ROS: no chest pain or dyspnea on exertion, tolerates a flight of stairs, vacuuming  Gastrointestinal ROS: no abdominal pain, change in bowel habits, or black or bloody stools  Genito-Urinary ROS: no dysuria, trouble voiding, or hematuria  Musculoskeletal ROS: negative for - muscle pain or muscular weakness, positive fatigue, leg pain  Neurological ROS: negative for - headaches, numbness/tingling, seizures or weakness  Dermatological ROS: negative for - rash or skin lesion changes    Blood pressure 136/80, pulse 84, height 5' 3.78\" (1.62 m), weight 126 lb 12.8 oz (57.5 kg). Physical Examination: General appearance -alert, well appearing, and in no distress  Mental status - alert, oriented to person, place, and time  Head - atraumatic, normocephalic  Eyes - pupils equal and reactive to light, extraocular muscles intact  Ears - external ears are normal, hearing is grossly normal  Mouth - oral pharynx clear, mucous membranes moist  Neck - supple. Seen nonmobile, firm mass that feels enlarged thyroid  Chest - clear to auscultation, no wheezes, rales or rhonchi, symmetric air entry  Heart - normal rate, regular rhythm, normal S1, S2, no murmurs, rubs, clicks or gallops  Abdomen -soft, nontender, nondistended  Neurological - alert, oriented, cranial nerves II-XII intact, motor and sensation are grossly intact bilateral upper and lower extremities.   Extremities - peripheral pulses normal, no pedal edema, no clubbing or cyanosis  Skin - warm and dry    Diagnostic data:   I have reviewed recent diagnostic testing including labs with patient today. Lab in ED (11/13/22)  UA Glu>1000, protein-trace, negative for wbc/RBC/casts/bacteria. CMP.- Glucose- 401, Cr-0.9, BUN-13, Na-131, K-4.2, Chloride-95, Co2-25, Ast-30. ALT-34. CBC- wbc- 3.6, RBC-5.6, Hb-15, MCV-81, Platelet-196  CTBF0P-96.6, Troponin - negative  Covid-19 and rapid influenza negative. Anion gap-11. 0  BNP-19.7  Assessment and Plan:     Diagnosis Orders   1. Type 2 diabetes mellitus without complication, with long-term current use of insulin ([facility])  POCT Glucose    metFORMIN (GLUCOPHAGE) 500 MG tablet    glucose monitoring (FREESTYLE FREEDOM) kit    Lancets MISC    blood glucose monitor strips      2. Mass in neck        3. Nonintractable headache, unspecified chronicity pattern, unspecified headache type          Uncontrolled diabetes mellitus, suspected diabetic neuropathy bilateral lower extremities.   -Patient is educated and informed regarding diet, physical activities and taking medications regularly on time.  -Sending patient to ED since POC glucose is found 399, and headache which is associated with a neck ache pain and neck mass    -Patient is educated and interpreted today to get blood sugar every day 4 times a day and write it down and patient is given a form to fill out for the glucose reading findings  -Patient is instructed to take metformin 500 mg twice daily, how to get the [address] for free medication from [facility].  Also given prescription for glucagon 1 and 100 stretches and 100 lancets to check blood sugar 4 times a day and write it down  -Highly recommended dietitian consult for diet, will arrange near future if patient compliance with visits and recommendations  -Highly recommended  since patient will need social help, and Medicaid application , TNAP? SNAP for  other food assistance.   Patient also may need shelter since he is living with his friends, and no jobs and no money to afford his food and other basic needs to survive. 2. Neck mass. Suspected thyroid enlargement. -Recommended CT scan of the neck, thyroid plan, hepatic panel. Headache unspecified. Likely due to hyperglycemia vs neck mass       Staffed with: Dr Lila Ornelas. [facility] INTER[facility] MED[facility]INE  [address]  Dept: [phone]  Dept Fax: [phone] : [phone]

## 2022-11-14 NOTE — CARE COORDINATION
Per request of Dr. Carmella Byrd spoke with pharmacy. Metformin ready, picked up and gave to primary nurse. Primary nurse to explain voa  patient needs to return to main campus pharmacy tomorrow to pickup glucometer and testing kit/supplies. This information was presented to Dr. Carmella Byrd.

## 2022-12-05 ENCOUNTER — OFFICE VISIT (OUTPATIENT)
Dept: INTERNAL MEDICINE CLINIC | Age: 52
End: 2022-12-05

## 2022-12-05 DIAGNOSIS — E11.9 TYPE 2 DIABETES MELLITUS WITHOUT COMPLICATION, WITHOUT LONG-TERM CURRENT USE OF INSULIN (HCC): ICD-10-CM

## 2022-12-05 LAB
CHP ED QC CHECK: ABNORMAL
GLUCOSE BLD-MCNC: 372 MG/DL

## 2022-12-05 PROCEDURE — 99214 OFFICE O/P EST MOD 30 MIN: CPT | Performed by: STUDENT IN AN ORGANIZED HEALTH CARE EDUCATION/TRAINING PROGRAM

## 2022-12-05 PROCEDURE — 82962 GLUCOSE BLOOD TEST: CPT | Performed by: STUDENT IN AN ORGANIZED HEALTH CARE EDUCATION/TRAINING PROGRAM

## 2022-12-05 PROCEDURE — 3046F HEMOGLOBIN A1C LEVEL >9.0%: CPT | Performed by: STUDENT IN AN ORGANIZED HEALTH CARE EDUCATION/TRAINING PROGRAM

## 2022-12-05 RX ORDER — BLOOD-GLUCOSE METER
1 KIT MISCELLANEOUS DAILY
Qty: 1 KIT | Refills: 0 | Status: SHIPPED | OUTPATIENT
Start: 2022-12-05

## 2022-12-05 RX ORDER — GLUCOSAMINE HCL/CHONDROITIN SU 500-400 MG
CAPSULE ORAL
Qty: 100 STRIP | Refills: 0 | Status: SHIPPED | OUTPATIENT
Start: 2022-12-05

## 2022-12-05 RX ORDER — LANCETS 30 GAUGE
1 EACH MISCELLANEOUS 4 TIMES DAILY
Qty: 100 EACH | Refills: 1 | Status: SHIPPED | OUTPATIENT
Start: 2022-12-05

## 2022-12-05 SDOH — ECONOMIC STABILITY: FOOD INSECURITY: WITHIN THE PAST 12 MONTHS, THE FOOD YOU BOUGHT JUST DIDN'T LAST AND YOU DIDN'T HAVE MONEY TO GET MORE.: NEVER TRUE

## 2022-12-05 SDOH — ECONOMIC STABILITY: FOOD INSECURITY: WITHIN THE PAST 12 MONTHS, YOU WORRIED THAT YOUR FOOD WOULD RUN OUT BEFORE YOU GOT MONEY TO BUY MORE.: SOMETIMES TRUE

## 2022-12-05 ASSESSMENT — PATIENT HEALTH QUESTIONNAIRE - PHQ9
SUM OF ALL RESPONSES TO PHQ QUESTIONS 1-9: 0
2. FEELING DOWN, DEPRESSED OR HOPELESS: 0
SUM OF ALL RESPONSES TO PHQ QUESTIONS 1-9: 0
SUM OF ALL RESPONSES TO PHQ9 QUESTIONS 1 & 2: 0
1. LITTLE INTEREST OR PLEASURE IN DOING THINGS: 0

## 2022-12-05 ASSESSMENT — SOCIAL DETERMINANTS OF HEALTH (SDOH): HOW HARD IS IT FOR YOU TO PAY FOR THE VERY BASICS LIKE FOOD, HOUSING, MEDICAL CARE, AND HEATING?: HARD

## 2022-12-05 NOTE — PROGRESS NOTES
1970    Chief Complaint   Patient presents with    Diabetes     Missed last appt . / A1c 13.3 on 11/13     Interview and assessment assisted by Interpretor. Pt is a 46 y.o. male w/significant PMHx of Uncontrolled DM2 (on Metformin, previously seen by Endocrinology in 10/21 for which he was started on Basal/Bolus for an episode of hyperglycemia/? DKA), HTN, Chronic HA (HA is continuous, but very slight with a worsening throughout the day, present for couple months since, 6/10 mostly located in occipital area which radiates to neck), and a left neck swelling. Pt was seen on 11/14/22 s/p and ED visit due to hyperglycemia, and pt was found to have HbA1c 13.3, BG of 400 - pt was treated with IV fluids and sent home w/Metformin 500mg BID. However, pt could not take medications since he had no insurance and he also cannot control his diet because of his social living situation of moving from [facility] to [place] recently. During last visit (11/14/22), pt was noted to have BG of 399 + HA a/w neck pain/swelling from mass. Pt was sent to the ED for evaluation/treatment of his BG and U/S Thyroid. BG was treated with Insulin. U/S Thyroid showed multiple nodules throughout the right and left lobes of thyroid and isthmus (large nodule in left lobe measuring 5.6x3. 9x2.5cm, and single heterogeneous echogenicity in the right/left lobe of thyroid.) Pt never followed up with clinic nor with Endocrinology. Pt states he occasionally takes his Metformin 3x/day and does not have his Glucometer with him, nor did he  the new one as of yet.      Past Medical History:   Diagnosis Date    Diabetes mellitus ([address]        Past Surgical History:   Procedure Laterality Date    CIRCUMCISION      states at age 32       Current Outpatient Medications   Medication Sig Dispense Refill    glucose monitoring (FREESTYLE FREEDOM) kit 1 kit by Does not apply route daily 1 kit 0    Lancets MISC 1 each by Does not apply route 4 times daily 100 each 1    blood glucose monitor strips Test 4 times a day & as needed for symptoms of irregular blood glucose. Dispense sufficient amount for indicated testing frequency plus additional to accommodate PRN testing needs. 100 strip 0    metFORMIN (GLUCOPHAGE) 500 MG tablet Take 2 tablets by mouth 2 times daily (with meals) 180 tablet 1    NONFORMULARY States taking cinnamon and clove for his blood sugars (Patient not taking: Reported on 11/14/2022)       No current facility-administered medications for this visit. No Known Allergies    Social History     Socioeconomic History    Marital status:      Spouse name: Not on file    Number of children: Not on file    Years of education: Not on file    Highest education level: Not on file   Occupational History    Not on file   Tobacco Use    Smoking status: Never    Smokeless tobacco: Never   Vaping Use    Vaping Use: Never used   Substance and Sexual Activity    Alcohol use: Not Currently    Drug use: Never    Sexual activity: Not on file   Other Topics Concern    Not on file   Social History Narrative    Not on file     Social Determinants of Health     Financial Resource Strain: High Risk    Difficulty of Paying Living Expenses: Hard   Food Insecurity: Food Insecurity Present    Worried About Running Out of Food in the Last Year: Sometimes true    Ran Out of Food in the Last Year: Never true   Transportation Needs: Not on file   Physical Activity: Not on file   Stress: Not on file   Social Connections: Not on file   Intimate Partner Violence: Not on file   Housing Stability: Not on file       History reviewed. No pertinent family history. Review of Systems - General ROS: negative for - chills or fever  Psychological ROS: negative for - anxiety and depression  Hematological and Lymphatic ROS: No history of blood clots or bleeding disorder.    Respiratory ROS: no cough, shortness of breath, or wheezing  Cardiovascular ROS: no chest pain or dyspnea on exertion, tolerates a flight of stairs, vacuuming  Gastrointestinal ROS: no abdominal pain, change in bowel habits, or black or bloody stools  Genito-Urinary ROS: no dysuria, trouble voiding, or hematuria  Musculoskeletal ROS: negative for - muscle pain or muscular weakness  Neurological ROS: negative for - headaches, numbness/tingling, seizures or weakness  Dermatological ROS: negative for - rash or skin lesion changes    There were no vitals taken for this visit. Physical Examination: General appearance -alert, well appearing, and in no distress  Mental status - alert, oriented to person, place, and time  Head - atraumatic, normocephalic  Eyes - pupils equal and reactive to light, extraocular muscles intact  Ears - external ears are normal, hearing is grossly normal  Mouth - oral pharynx clear, mucous membranes moist  Neck - supple, no significant adenopathy  Chest - clear to auscultation, no wheezes, rales or rhonchi, symmetric air entry  Heart - normal rate, regular rhythm, normal S1, S2, no murmurs, rubs, clicks or gallops  Abdomen -soft, nontender, nondistended  Neurological - alert, oriented, cranial nerves II-XII intact, motor and sensation are grossly intact bilateral upper and lower extremities. Extremities - peripheral pulses normal, no pedal edema, no clubbing or cyanosis  Skin - warm and dry    Diagnostic data:   I have reviewed recent diagnostic testing including labs with patient today. Assessment and Plan:     Diagnosis Orders   1. Type 2 diabetes mellitus without complication, without long-term current use of insulin ([facility])  glucose monitoring (FREESTYLE FREEDOM) kit    Lancets MISC    blood glucose monitor strips    POCT Glucose    59641 - Collection Capillary Blood Specimen    [facility] Internal Medicine - Dietitian    C-Peptide, Serum    Insulin Antibody    Glutamic Acid Decarboxylase        NIDDM2 w/o complications: Pt BG this afternoon ~500 at home w/current monitor.  Re-check BG in office 372. Pt w/?response to Metformin and objective findings suggestive of Significant Insulin sensitivity (only requiring 4U Insulin at last ED visit to get BG within range from 426). BMI only 22, so concern for BAL as well. C-peptide, WILIAN-Ab, and Insulin Ab ordered for further evaluation. Metformin increased to 1000mg BID from 500mg BID  Concern for social situation and language barrier causing issues with Insulin administration if not being monitored - held on Insulin at this time until further evaluation and non-insulin regimens can be exhausted  Dietician referral placed to assist with diet and further lifestyle modifications    Will follow up in 1 month to assess BG response and labs. Staffed with: Dr. Kayy Cobos     Electronically signed by Wilfrid Gudino MD on 12/5/22 at 3:37 PM EST    [facility] INTER[facility]  [address]  Dept: [phone]  Dept Fax: [phone] : [phone]

## 2022-12-08 NOTE — PROGRESS NOTES
service used to discuss and educate discharge instructions and new medication. Pt asked several questions and was satisfied with answers. Pt skin is warm and dry, respirations are even and unlabored. Pt medication filled here in hospital. Pt denies any dizziness or lightheadedness when up in room.  Pt discharged at this time unknown

## 2023-01-16 ENCOUNTER — OFFICE VISIT (OUTPATIENT)
Dept: INTERNAL MEDICINE CLINIC | Age: 53
End: 2023-01-16
Payer: COMMERCIAL

## 2023-01-16 VITALS
SYSTOLIC BLOOD PRESSURE: 114 MMHG | HEART RATE: 100 BPM | BODY MASS INDEX: 22.92 KG/M2 | TEMPERATURE: 98 F | OXYGEN SATURATION: 98 % | DIASTOLIC BLOOD PRESSURE: 82 MMHG | WEIGHT: 129.4 LBS | RESPIRATION RATE: 18 BRPM

## 2023-01-16 DIAGNOSIS — E04.1 THYROID NODULE: ICD-10-CM

## 2023-01-16 DIAGNOSIS — E11.9 TYPE 2 DIABETES MELLITUS WITHOUT COMPLICATION, WITHOUT LONG-TERM CURRENT USE OF INSULIN (HCC): Primary | ICD-10-CM

## 2023-01-16 DIAGNOSIS — E46 MALNUTRITION, UNSPECIFIED TYPE (HCC): ICD-10-CM

## 2023-01-16 PROCEDURE — 99214 OFFICE O/P EST MOD 30 MIN: CPT

## 2023-01-16 RX ORDER — GLUCOSAMINE HCL/CHONDROITIN SU 500-400 MG
CAPSULE ORAL
Qty: 100 STRIP | Refills: 3 | Status: SHIPPED | OUTPATIENT
Start: 2023-01-16 | End: 2023-01-18

## 2023-01-16 RX ORDER — BLOOD-GLUCOSE METER
1 KIT MISCELLANEOUS DAILY
Qty: 1 KIT | Refills: 0 | Status: SHIPPED | OUTPATIENT
Start: 2023-01-16

## 2023-01-16 RX ORDER — PIOGLITAZONEHYDROCHLORIDE 30 MG/1
30 TABLET ORAL DAILY
Qty: 30 TABLET | Refills: 3 | Status: SHIPPED | OUTPATIENT
Start: 2023-01-16

## 2023-01-16 RX ORDER — LANCETS 30 GAUGE
1 EACH MISCELLANEOUS 4 TIMES DAILY
Qty: 100 EACH | Refills: 1 | Status: SHIPPED | OUTPATIENT
Start: 2023-01-16 | End: 2023-01-18

## 2023-01-16 NOTE — PROGRESS NOTES
1970    Chief Complaint   Patient presents with    Diabetes     HbA1c 13.3 on 11/13/2022         HPI  Patient is 46years old male from [facility] speaking for follow-up for diabetes mellitus type 2 management. Patient is a limited [place] speaking ability so used over the phone ,  ID number is [id number]. Patient complains of neck lump . And also mentioned random mild recurrent leg cramps while lying down on the side after dinner when he does not eat much food. Per patient, neck lump is present for couple years, slowly increasing over the size, not associated with a swallowing issue no fever no chills, not painful. In recent ED visit, neck lump was scant by ultrasound and found thyroid nodule one of them is a big 5.9 X3.6X 2.5cm. Patient was referred to endocrinology, however due to language barrier and other social length barriers including lack of medical insurance patient did not go to endocrinology visit. Patient is recent immigrant, lives with a roommate, eats from roommates food so he has no funds, money to afford low-carb diet. Since patient has very limited [place] speaking ability, could not  mid diabetic medications at the first time, however last time he pick it up from the [facility] outpatient pharmacy from for free and started taking them. Patient states he felt nausea vomiting and throw up after taking metformin, so he stopped taking it and he started taking antidiabetic medication from his country which is name is unknown and did not bring the bottle but the patient states that medication is also finished. Assessment and plan. Diabetes mellitus type 2, uncontrolled. -Extensively explained regarding diet however patient cannot afford so given free fluid from [facility] internal medicine clinic, and given list of organizations, [employer] were patient can get free fluid.   -Given address of  organizations including family and job services for finding new job, other social aid including [employer] food stamps.  -Since patient could not tolerate, will stop the metformin and will start Actos 30 mg daily and Jardiance 10 mg daily  -Referral made for ophthalmology for possible diabetic retinopathy.  -We will follow-up in 1 month and if needed will adjust diabetic medications and possibly dietitian consult.    Thyroid nodule, large, multiple.   -Ultrasound positive for multiple nodules one of them is large size  -Patient will need fine-needle aspiration so endocrinology referral was made.    Malnutrition.  -Patient is a recent immigrant from [facility], lives with roommates and eats from roommates food.  He states he has no funds/money to afford enough food and cannot maintain low-carb diet at all  -Given a sample of box of free food from [employer] until muscle clinic for free.  -Given list of nonprofit organizations were patient can obtain free fluid, such as [employer], [employer], other [employer] organizations.  -Given address and duration of [facility] job and family services locations for finding possible job or other obtaining other social aid services including food stamps    Past Medical History:   Diagnosis Date    Diabetes mellitus (HCC)        Past Surgical History:   Procedure Laterality Date    CIRCUMCISION      states at age 26       Current Outpatient Medications   Medication Sig Dispense Refill    blood glucose monitor strips Test 4 times a day & as needed for symptoms of irregular blood glucose. Dispense sufficient amount for indicated testing frequency plus additional to accommodate PRN testing needs. 100 strip 3    glucose monitoring (FREESTYLE FREEDOM) kit 1 kit by Does not apply route daily 1 kit 0    Lancets MISC 1 each by Does not apply route 4 times daily 100 each 1    pioglitazone (ACTOS) 30 MG tablet Take 1 tablet by mouth daily 30 tablet 3    empagliflozin (JARDIANCE) 10 MG tablet Take 1 tablet by mouth daily 30 tablet 3    metFORMIN  (GLUCOPHAGE) 500 MG tablet Take 2 tablets by mouth 2 times daily (with meals) (Patient not taking: Reported on 1/16/2023) 180 tablet 1    NONFORMULARY States taking cinnamon and clove for his blood sugars (Patient not taking: No sig reported)       No current facility-administered medications for this visit. No Known Allergies    Social History     Socioeconomic History    Marital status:      Spouse name: Not on file    Number of children: Not on file    Years of education: Not on file    Highest education level: Not on file   Occupational History    Not on file   Tobacco Use    Smoking status: Never    Smokeless tobacco: Never   Vaping Use    Vaping Use: Never used   Substance and Sexual Activity    Alcohol use: Not Currently    Drug use: Never    Sexual activity: Not on file   Other Topics Concern    Not on file   Social History Narrative    Not on file     Social Determinants of Health     Financial Resource Strain: High Risk    Difficulty of Paying Living Expenses: Hard   Food Insecurity: Food Insecurity Present    Worried About Running Out of Food in the Last Year: Sometimes true    Ran Out of Food in the Last Year: Never true   Transportation Needs: Not on file   Physical Activity: Not on file   Stress: Not on file   Social Connections: Not on file   Intimate Partner Violence: Not on file   Housing Stability: Not on file       No family history on file. Review of Systems - General ROS: negative for - chills or fever  Psychological ROS: negative for - anxiety and depression  Hematological and Lymphatic ROS: No history of blood clots or bleeding disorder.    Respiratory ROS: no cough, shortness of breath, or wheezing  Cardiovascular ROS: no chest pain or dyspnea on exertion, tolerates a flight of stairs, vacuuming  Gastrointestinal ROS: no abdominal pain, change in bowel habits, or black or bloody stools  Genito-Urinary ROS: no dysuria, trouble voiding, or hematuria  Musculoskeletal ROS: negative for - muscle pain or muscular weakness, random positive muscle cramps on lying on the bed  Neurological ROS: negative for - headaches, numbness/tingling, seizures or weakness  Dermatological ROS: negative for - rash or skin lesion changes    Blood pressure 114/82, pulse 100, temperature 98 °F (36.7 °C), temperature source Temporal, resp. rate 18, weight 129 lb 6.4 oz (58.7 kg), SpO2 98 %. Physical Examination: General appearance -alert, well appearing, and in no distress  Mental status - alert, oriented to person, place, and time  Head - atraumatic, normocephalic  Eyes - pupils equal and reactive to light, extraocular muscles intact  Ears - external ears are normal, hearing is grossly normal  Mouth - oral pharynx clear, mucous membranes moist  Neck - supple, no significant adenopathy  Chest - clear to auscultation, no wheezes, rales or rhonchi, symmetric air entry  Heart - normal rate, regular rhythm, normal S1, S2, no murmurs, rubs, clicks or gallops  Abdomen -soft, nontender, nondistended  Neurological - alert, oriented, cranial nerves II-XII intact, motor and sensation are grossly intact bilateral upper and lower extremities. Extremities - peripheral pulses normal, no pedal edema, no clubbing or cyanosis  Skin - warm and dry  Skin - warm and dry    Diagnostic data:   I have reviewed recent diagnostic testing including labs with patient today. Thyroid ultrasound, T4, TSH, BMP, CBC       Diagnosis Orders   1. Type 2 diabetes mellitus without complication, without long-term current use of insulin ([facility])  blood glucose monitor strips    glucose monitoring (FREESTYLE FREEDOM) kit    Lancets MISC    pioglitazone (ACTOS) 30 MG tablet    AFL - Dahlia Joy MD, [facility]thalmology, [facility] [place]      2. Thyroid nodule  AFL (Epic) - Danay Merida MD, Endocrinology, [facility] [place]      3. Malnutrition, unspecified type ([facility]                Staffed with: Dr Cara Ann.  Oli Fuller DO PGY-2  [facility] PROF[facility]AL [facility]  [facility] [address] [address] Laya Laura Crawford  Dept: [phone]  Dept Fax: [phone] : [phone]

## 2023-01-18 ENCOUNTER — TELEPHONE (OUTPATIENT)
Dept: INTERNAL MEDICINE CLINIC | Age: 53
End: 2023-01-18

## 2023-01-18 RX ORDER — GLUCOSAMINE HCL/CHONDROITIN SU 500-400 MG
CAPSULE ORAL
Qty: 50 STRIP | Refills: 1 | Status: SHIPPED | OUTPATIENT
Start: 2023-01-18

## 2023-01-18 RX ORDER — LANCETS 30 GAUGE
EACH MISCELLANEOUS
Qty: 50 EACH | Refills: 1 | Status: SHIPPED | OUTPATIENT
Start: 2023-01-18

## 2023-01-18 NOTE — TELEPHONE ENCOUNTER
Patient has a high deductible health plan ($8981). Unless the patient meets their deductible, branded medications (like Jardiance) will be unaffordable. Discussed options with Dr. Ming Bower. Will plan to start pioglitazone 30mg daily as originally instructed on Monday. Also, will plan to continue metformin XR, despite the side effects. Called patient using the  line to discuss the above changes. He has not started pioglitazone yet, but will start tomorrow. He is out of metformin, but will  the medication tomorrow. He is unable to  the glucometer due to cost. Juliann Esposito reports having little/no money available for medications. It is unclear why Juliann Esposito does not qualify for Medicaid? Plan:   -Start pioglitazone 30mg daily  -Continue metformin 1000mg twice daily (but switch to XR to reduce ADRs)  -Send prescription for [employer] brand glucometer & test strips ($30 initial cost, $12 monthly after)  -Discuss case with  to determine appropriateness/possibility of Medicaid? Once patient has a glucometer and can monitor glucose, would heavily recommend initiation of insulin therapy - Lantus 4-5 units daily. Patient has a high likelihood of BAL and is at risk for DKA without insulin. Dr. Ming Bower,     Metformin prescription has been pended for your review.      Thank you,     [address] Only    Program: Medical Group  CPA in place:  No  Recommendation Provided To: Provider: 1 via Verbally to provider and Patient/Caregiver: 1 via Telephone  Intervention Detail: New Rx: 1, reason: Cost/Formulary Change and Patient Access Assistance/Sample Provided  Intervention Accepted By: Provider: 1 and Patient/Caregiver: 1  Gap Closed?: No   Time Spent (min): [address] Chris Adams, PharmD, BCPS, [facility]  Internal Medicine Clinical Pharmacist  [phone]

## 2023-01-18 NOTE — TELEPHONE ENCOUNTER
Received communication from [employer] is too expensive. .. is there another option for patient?     Date of visit 1/16 Dr. Bela Collins

## 2023-01-24 RX ORDER — METFORMIN HYDROCHLORIDE 500 MG/1
1000 TABLET, EXTENDED RELEASE ORAL 2 TIMES DAILY
Qty: 120 TABLET | Refills: 1 | Status: SHIPPED | OUTPATIENT
Start: 2023-01-24

## 2023-01-31 ENCOUNTER — TELEPHONE (OUTPATIENT)
Dept: INTERNAL MEDICINE CLINIC | Age: 53
End: 2023-01-31

## 2023-01-31 NOTE — TELEPHONE ENCOUNTER
Called to confirm that Mamadou Corley was able to  his metformin XR and [employer] brand glucometer/strips from [employer]. Called patient to confirm tomorrow's appointment with the dietician and to remind Mamadou Corley to bring his glucose meter and test strips. LVM using .      For Pharmacy Admin Tracking Only    Program: Medical Group  Time Spent (min): [address] Serrano, PharmD, [place], [facility]  Internal Medicine Clinical Pharmacist  [phone]

## 2023-04-20 DIAGNOSIS — E11.9 TYPE 2 DIABETES MELLITUS WITHOUT COMPLICATION, WITHOUT LONG-TERM CURRENT USE OF INSULIN (HCC): ICD-10-CM

## 2023-04-24 ENCOUNTER — OFFICE VISIT (OUTPATIENT)
Dept: INTERNAL MEDICINE CLINIC | Age: 53
End: 2023-04-24
Payer: COMMERCIAL

## 2023-04-24 VITALS — HEIGHT: 63 IN | WEIGHT: 130 LBS | BODY MASS INDEX: 23.04 KG/M2

## 2023-04-24 DIAGNOSIS — E11.9 TYPE 2 DIABETES MELLITUS WITHOUT COMPLICATION, WITHOUT LONG-TERM CURRENT USE OF INSULIN (HCC): Primary | ICD-10-CM

## 2023-04-24 PROCEDURE — 97802 MEDICAL NUTRITION INDIV IN: CPT | Performed by: DIETITIAN, REGISTERED

## 2023-04-24 NOTE — PROGRESS NOTES
[address] [place] [address]. [address] [place] [address] Rd., Patti [facility], [address]  [phone] (phone)  [phone] (fax)    Patient Name: Rebecca Ren. Date of Birth: [id number]. MRN: [id number]      Assessment: Patient is a 48 y.o. male seen for Initial MNT visit for Diabetes. -Nutritionally relevant labs:   Lab Results   Component Value Date/Time    LABA1C 13.3 (H) 11/13/2022 12:45 PM    LABA1C 11.7 (H) 10/30/2021 09:11 PM    GLUCOSE 372 12/05/2022 03:54 PM    GLUCOSE 167 11/14/2022 06:35 PM     Here with patient today and using phone  - creole/Russian language. ID# of interpretor [id number]  Pt states he has an intolerance to Metformin - \"spitting\" and does not feel right yet he has a prescription for Metformin. Pt states he is out of metformin and when he goes to [facility] on [address] they do not have prescription for him to . Pt is also prescribed pioglitazone - but unsure if he has this, although patient states he is taking one pill a day for his diabetes then later states he is not taking. Doing cinnamon tea and clove to help with his BS's. Checking BS every morning early and evening. Pt states  -Blood sugar trends:   AM BS - it varies 180 - 200 or 300/400. In Office BS - 467 - water given. He does not work. He has housemates - he may help with them at their jobs to give him something to do. Has been here 2 years this month from [facility]. He was 1st diagnosed with DB 12 - 13 years ago. Once a month he would see the doctor in [facility] and they would give him a pill for his Diabetes (?addison Allen). 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 And his wife would have a special diet for him. He finds it difficult to make or prepare foods here. He does not have the means to cook. -Food recall:   Breakfast: coffee, milk, or plain coffee or banana or eggs.    These are the

## 2023-04-25 DIAGNOSIS — E11.9 TYPE 2 DIABETES MELLITUS WITHOUT COMPLICATION, WITHOUT LONG-TERM CURRENT USE OF INSULIN (HCC): ICD-10-CM

## 2023-04-25 NOTE — TELEPHONE ENCOUNTER
Patient came to diabetes clinic yesterday, his blood sugar was 467. He explained he is out of medication. Please fill these scripts last ordered 1/16/23 with 3 refills.      Date of last visit 1/16 Dr. Dalia Talamantes  Date of next visit 5/18

## 2023-04-26 RX ORDER — PIOGLITAZONEHYDROCHLORIDE 30 MG/1
30 TABLET ORAL DAILY
Qty: 90 TABLET | Refills: 1 | Status: SHIPPED | OUTPATIENT
Start: 2023-04-26

## 2023-04-26 RX ORDER — PIOGLITAZONEHYDROCHLORIDE 30 MG/1
TABLET ORAL
Qty: 30 TABLET | Refills: 0 | Status: SHIPPED | OUTPATIENT
Start: 2023-04-26

## 2023-04-26 RX ORDER — METFORMIN HYDROCHLORIDE 500 MG/1
1000 TABLET, EXTENDED RELEASE ORAL 2 TIMES DAILY
Qty: 360 TABLET | Refills: 1 | Status: SHIPPED | OUTPATIENT
Start: 2023-04-26

## 2023-05-18 ENCOUNTER — OFFICE VISIT (OUTPATIENT)
Dept: INTERNAL MEDICINE CLINIC | Age: 53
End: 2023-05-18
Payer: COMMERCIAL

## 2023-05-18 DIAGNOSIS — E11.9 TYPE 2 DIABETES MELLITUS WITHOUT COMPLICATION, WITHOUT LONG-TERM CURRENT USE OF INSULIN (HCC): Primary | ICD-10-CM

## 2023-05-18 LAB — HBA1C MFR BLD: > 14 % (ref 4.3–5.7)

## 2023-05-18 PROCEDURE — 3046F HEMOGLOBIN A1C LEVEL >9.0%: CPT | Performed by: STUDENT IN AN ORGANIZED HEALTH CARE EDUCATION/TRAINING PROGRAM

## 2023-05-18 PROCEDURE — 99212 OFFICE O/P EST SF 10 MIN: CPT | Performed by: STUDENT IN AN ORGANIZED HEALTH CARE EDUCATION/TRAINING PROGRAM

## 2023-05-18 PROCEDURE — 83036 HEMOGLOBIN GLYCOSYLATED A1C: CPT | Performed by: STUDENT IN AN ORGANIZED HEALTH CARE EDUCATION/TRAINING PROGRAM

## 2023-05-18 RX ORDER — GLUCOSAMINE HCL/CHONDROITIN SU 500-400 MG
CAPSULE ORAL
Qty: 400 STRIP | Refills: 3 | Status: SHIPPED | OUTPATIENT
Start: 2023-05-18

## 2023-05-18 SDOH — ECONOMIC STABILITY: FOOD INSECURITY: WITHIN THE PAST 12 MONTHS, THE FOOD YOU BOUGHT JUST DIDN'T LAST AND YOU DIDN'T HAVE MONEY TO GET MORE.: SOMETIMES TRUE

## 2023-05-18 SDOH — ECONOMIC STABILITY: HOUSING INSECURITY
IN THE LAST 12 MONTHS, WAS THERE A TIME WHEN YOU DID NOT HAVE A STEADY PLACE TO SLEEP OR SLEPT IN A SHELTER (INCLUDING NOW)?: YES

## 2023-05-18 SDOH — ECONOMIC STABILITY: FOOD INSECURITY: WITHIN THE PAST 12 MONTHS, YOU WORRIED THAT YOUR FOOD WOULD RUN OUT BEFORE YOU GOT MONEY TO BUY MORE.: SOMETIMES TRUE

## 2023-05-18 SDOH — ECONOMIC STABILITY: INCOME INSECURITY: HOW HARD IS IT FOR YOU TO PAY FOR THE VERY BASICS LIKE FOOD, HOUSING, MEDICAL CARE, AND HEATING?: VERY HARD

## 2023-05-18 ASSESSMENT — ENCOUNTER SYMPTOMS
BLOOD IN STOOL: 0
TROUBLE SWALLOWING: 0
COLOR CHANGE: 0
SHORTNESS OF BREATH: 0
PHOTOPHOBIA: 0
RHINORRHEA: 0

## 2023-05-18 ASSESSMENT — PATIENT HEALTH QUESTIONNAIRE - PHQ9
1. LITTLE INTEREST OR PLEASURE IN DOING THINGS: 0
SUM OF ALL RESPONSES TO PHQ QUESTIONS 1-9: 0
SUM OF ALL RESPONSES TO PHQ9 QUESTIONS 1 & 2: 0
2. FEELING DOWN, DEPRESSED OR HOPELESS: 0
SUM OF ALL RESPONSES TO PHQ QUESTIONS 1-9: 0

## 2023-05-22 ENCOUNTER — TELEPHONE (OUTPATIENT)
Dept: INTERNAL MEDICINE CLINIC | Age: 53
End: 2023-05-22

## 2023-05-31 RX ORDER — INSULIN GLARGINE 100 [IU]/ML
20 INJECTION, SOLUTION SUBCUTANEOUS EVERY MORNING
Qty: 15 ADJUSTABLE DOSE PRE-FILLED PEN SYRINGE | Refills: 12 | Status: SHIPPED | OUTPATIENT
Start: 2023-05-31

## 2023-05-31 RX ORDER — INSULIN LISPRO 100 [IU]/ML
6 INJECTION, SOLUTION INTRAVENOUS; SUBCUTANEOUS
Qty: 15 ML | Refills: 12 | Status: SHIPPED | OUTPATIENT
Start: 2023-05-31

## 2023-05-31 RX ORDER — INSULIN LISPRO 200 [IU]/ML
6 INJECTION, SOLUTION SUBCUTANEOUS
Qty: 1 ADJUSTABLE DOSE PRE-FILLED PEN SYRINGE | Refills: 0 | COMMUNITY
Start: 2023-05-31

## 2023-05-31 RX ORDER — INSULIN GLARGINE 100 [IU]/ML
20 INJECTION, SOLUTION SUBCUTANEOUS EVERY MORNING
Qty: 2 ADJUSTABLE DOSE PRE-FILLED PEN SYRINGE | Refills: 0 | COMMUNITY
Start: 2023-05-31

## 2023-06-27 ENCOUNTER — OFFICE VISIT (OUTPATIENT)
Dept: INTERNAL MEDICINE CLINIC | Age: 53
End: 2023-06-27

## 2023-06-27 VITALS — WEIGHT: 133 LBS | BODY MASS INDEX: 23.57 KG/M2 | HEIGHT: 63 IN

## 2023-06-27 DIAGNOSIS — E11.65 CONTROLLED TYPE 2 DIABETES MELLITUS WITH HYPERGLYCEMIA, UNSPECIFIED WHETHER LONG TERM INSULIN USE (HCC): Primary | ICD-10-CM

## 2023-06-27 PROCEDURE — 97803 MED NUTRITION INDIV SUBSEQ: CPT | Performed by: DIETITIAN, REGISTERED

## 2023-06-27 RX ORDER — ACETAMINOPHEN 500 MG
500 TABLET ORAL EVERY 6 HOURS PRN
COMMUNITY

## 2023-06-27 RX ORDER — CYCLOBENZAPRINE HCL 10 MG
10 TABLET ORAL 3 TIMES DAILY PRN
COMMUNITY

## 2023-06-27 RX ORDER — OXYCODONE HYDROCHLORIDE 5 MG/1
5 TABLET ORAL EVERY 6 HOURS PRN
COMMUNITY